# Patient Record
Sex: FEMALE | Race: WHITE | Employment: UNEMPLOYED | URBAN - METROPOLITAN AREA
[De-identification: names, ages, dates, MRNs, and addresses within clinical notes are randomized per-mention and may not be internally consistent; named-entity substitution may affect disease eponyms.]

---

## 2017-05-16 LAB
CHOLESTEROL, TOTAL: 268 MG/DL
CHOLESTEROL/HDL RATIO: ABNORMAL
HDLC SERPL-MCNC: 60 MG/DL (ref 35–70)
LDL CHOLESTEROL CALCULATED: 167 MG/DL (ref 0–160)
NONHDLC SERPL-MCNC: ABNORMAL MG/DL
TRIGL SERPL-MCNC: 206 MG/DL
VLDLC SERPL CALC-MCNC: 41 MG/DL

## 2019-05-15 ENCOUNTER — HOSPITAL ENCOUNTER (OUTPATIENT)
Dept: MRI IMAGING | Age: 54
Discharge: HOME | End: 2019-05-17
Payer: MEDICARE

## 2019-05-15 DIAGNOSIS — M96.1 POSTLAMINECTOMY SYNDROME OF LUMBAR REGION: ICD-10-CM

## 2019-05-15 PROCEDURE — 72158 MRI LUMBAR SPINE W/O & W/DYE: CPT

## 2019-05-15 PROCEDURE — 6360000004 HC RX CONTRAST MEDICATION: Performed by: CLINIC/CENTER

## 2019-05-15 PROCEDURE — A9579 GAD-BASE MR CONTRAST NOS,1ML: HCPCS | Performed by: CLINIC/CENTER

## 2019-05-15 RX ADMIN — GADOTERIDOL 13 ML: 279.3 INJECTION, SOLUTION INTRAVENOUS at 09:05

## 2019-07-18 ENCOUNTER — HOSPITAL ENCOUNTER (OUTPATIENT)
Dept: GENERAL RADIOLOGY | Age: 54
Discharge: HOME | End: 2019-07-20
Payer: MEDICARE

## 2019-07-18 DIAGNOSIS — M54.5 LOW BACK PAIN, UNSPECIFIED BACK PAIN LATERALITY, UNSPECIFIED CHRONICITY, WITH SCIATICA PRESENCE UNSPECIFIED: ICD-10-CM

## 2019-07-18 PROCEDURE — 72110 X-RAY EXAM L-2 SPINE 4/>VWS: CPT

## 2019-10-17 ENCOUNTER — HOSPITAL ENCOUNTER (OUTPATIENT)
Dept: GENERAL RADIOLOGY | Age: 54
Discharge: HOME | End: 2019-10-19
Payer: MEDICARE

## 2019-10-17 DIAGNOSIS — M54.50 LOW BACK PAIN, UNSPECIFIED BACK PAIN LATERALITY, UNSPECIFIED CHRONICITY, UNSPECIFIED WHETHER SCIATICA PRESENT: ICD-10-CM

## 2019-10-17 PROCEDURE — 72100 X-RAY EXAM L-S SPINE 2/3 VWS: CPT

## 2020-06-10 LAB
HIV AG/AB: NORMAL
TSH SERPL DL<=0.05 MIU/L-ACNC: 1.73 UIU/ML

## 2020-12-04 ENCOUNTER — VIRTUAL VISIT (OUTPATIENT)
Dept: INTERNAL MEDICINE | Age: 55
End: 2020-12-04
Payer: MEDICARE

## 2020-12-04 PROBLEM — N95.0 PMB (POSTMENOPAUSAL BLEEDING): Status: ACTIVE | Noted: 2020-12-04

## 2020-12-04 PROBLEM — M47.817 LUMBOSACRAL SPONDYLOSIS WITHOUT MYELOPATHY: Status: ACTIVE | Noted: 2020-12-04

## 2020-12-04 PROBLEM — I25.10 CORONARY ARTERY DISEASE INVOLVING NATIVE HEART WITHOUT ANGINA PECTORIS: Status: ACTIVE | Noted: 2018-11-25

## 2020-12-04 PROBLEM — J44.9 ASTHMA-CHRONIC OBSTRUCTIVE PULMONARY DISEASE OVERLAP SYNDROME (HCC): Status: ACTIVE | Noted: 2020-12-04

## 2020-12-04 PROBLEM — M47.812 CERVICAL SPONDYLOSIS WITHOUT MYELOPATHY: Status: ACTIVE | Noted: 2020-12-04

## 2020-12-04 PROBLEM — M51.36 DEGENERATION OF LUMBAR INTERVERTEBRAL DISC: Status: ACTIVE | Noted: 2020-12-04

## 2020-12-04 PROBLEM — M46.1 INFLAMMATION OF SACROILIAC JOINT (HCC): Status: ACTIVE | Noted: 2018-04-02

## 2020-12-04 PROBLEM — M54.50 CHRONIC LOW BACK PAIN: Status: ACTIVE | Noted: 2020-12-04

## 2020-12-04 PROBLEM — M96.1 LUMBAR POST-LAMINECTOMY SYNDROME: Status: ACTIVE | Noted: 2020-12-04

## 2020-12-04 PROBLEM — J44.89 ASTHMA-CHRONIC OBSTRUCTIVE PULMONARY DISEASE OVERLAP SYNDROME: Status: ACTIVE | Noted: 2020-12-04

## 2020-12-04 PROBLEM — E78.2 MIXED HYPERLIPIDEMIA: Status: ACTIVE | Noted: 2020-12-04

## 2020-12-04 PROBLEM — E03.9 HYPOTHYROIDISM: Status: ACTIVE | Noted: 2020-12-04

## 2020-12-04 PROBLEM — M46.1 SACROILIITIS (HCC): Status: ACTIVE | Noted: 2020-12-04

## 2020-12-04 PROBLEM — M51.369 DEGENERATION OF LUMBAR INTERVERTEBRAL DISC: Status: ACTIVE | Noted: 2020-12-04

## 2020-12-04 PROBLEM — G89.29 CHRONIC LOW BACK PAIN: Status: ACTIVE | Noted: 2020-12-04

## 2020-12-04 PROCEDURE — G8421 BMI NOT CALCULATED: HCPCS | Performed by: FAMILY MEDICINE

## 2020-12-04 PROCEDURE — G8484 FLU IMMUNIZE NO ADMIN: HCPCS | Performed by: FAMILY MEDICINE

## 2020-12-04 PROCEDURE — G8427 DOCREV CUR MEDS BY ELIG CLIN: HCPCS | Performed by: FAMILY MEDICINE

## 2020-12-04 PROCEDURE — 99203 OFFICE O/P NEW LOW 30 MIN: CPT | Performed by: FAMILY MEDICINE

## 2020-12-04 PROCEDURE — 3017F COLORECTAL CA SCREEN DOC REV: CPT | Performed by: FAMILY MEDICINE

## 2020-12-04 PROCEDURE — 4004F PT TOBACCO SCREEN RCVD TLK: CPT | Performed by: FAMILY MEDICINE

## 2020-12-04 RX ORDER — CLOPIDOGREL BISULFATE 75 MG/1
75 TABLET ORAL DAILY
COMMUNITY
Start: 2018-10-10 | End: 2021-06-18 | Stop reason: SDUPTHER

## 2020-12-04 RX ORDER — ALBUTEROL SULFATE 90 UG/1
AEROSOL, METERED RESPIRATORY (INHALATION)
COMMUNITY
Start: 2019-01-08 | End: 2021-06-18 | Stop reason: SDUPTHER

## 2020-12-04 RX ORDER — NITROGLYCERIN 0.4 MG/1
TABLET SUBLINGUAL
COMMUNITY
Start: 2018-10-10

## 2020-12-04 RX ORDER — ALBUTEROL SULFATE 2.5 MG/3ML
2.5 SOLUTION RESPIRATORY (INHALATION)
COMMUNITY
Start: 2019-01-08

## 2020-12-04 RX ORDER — DULOXETIN HYDROCHLORIDE 20 MG/1
20 CAPSULE, DELAYED RELEASE ORAL DAILY
Qty: 30 CAPSULE | Refills: 0 | Status: SHIPPED | OUTPATIENT
Start: 2020-12-04 | End: 2021-06-18 | Stop reason: SDUPTHER

## 2020-12-04 RX ORDER — CARVEDILOL 6.25 MG/1
6.25 TABLET ORAL 2 TIMES DAILY WITH MEALS
COMMUNITY
Start: 2018-10-25 | End: 2021-06-18 | Stop reason: SDUPTHER

## 2020-12-04 RX ORDER — ATORVASTATIN CALCIUM 80 MG/1
TABLET, FILM COATED ORAL
COMMUNITY
Start: 2018-10-10 | End: 2021-06-18 | Stop reason: SDUPTHER

## 2020-12-04 RX ORDER — LEVOTHYROXINE SODIUM 0.03 MG/1
25 TABLET ORAL DAILY
COMMUNITY
Start: 2019-05-14 | End: 2021-02-22 | Stop reason: SDUPTHER

## 2020-12-04 ASSESSMENT — ENCOUNTER SYMPTOMS: BACK PAIN: 1

## 2020-12-04 NOTE — PROGRESS NOTES
Patient: Sid Knox    YOB: 1965    Date: 12/4/20       Patient Active Problem List    Diagnosis Date Noted    Sacroiliitis ([facility] [address] 12/04/2020    Asthma-chronic obstructive pulmonary disease overlap syndrome ([facility] [address] 12/04/2020    Cervical spondylosis without myelopathy 12/04/2020    Chronic low back pain 12/04/2020    Degeneration of lumbar intervertebral disc 12/04/2020    Hypothyroidism 12/04/2020    Lumbar post-laminectomy syndrome 12/04/2020    Lumbosacral spondylosis without myelopathy 12/04/2020    Mixed hyperlipidemia 12/04/2020    PMB (postmenopausal bleeding) 12/04/2020    Coronary artery disease involving native heart without angina pectoris 11/25/2018    Lower urinary tract infectious disease 11/25/2013     No past medical history on file. No past surgical history on file. No family history on file.   Social History     Socioeconomic History    Marital status: Single     Spouse name: Not on file    Number of children: Not on file    Years of education: Not on file    Highest education level: Not on file   Occupational History    Not on file   Social Needs    Financial resource strain: Not on file    Food insecurity     Worry: Not on file     Inability: Not on file    Transportation needs     Medical: Not on file     Non-medical: Not on file   Tobacco Use    Smoking status: Not on file   Substance and Sexual Activity    Alcohol use: Not on file    Drug use: Not on file    Sexual activity: Not on file   Lifestyle    Physical activity     Days per week: Not on file     Minutes per session: Not on file    Stress: Not on file   Relationships    Social connections     Talks on phone: Not on file     Gets together: Not on file     Attends Alevism service: Not on file     Active member of club or organization: Not on file     Attends meetings of clubs or organizations: Not on file     Relationship status: Not on file    Intimate partner violence     Fear of current or ex partner: Not on file     Emotionally abused: Not on file     Physically abused: Not on file     Forced sexual activity: Not on file   Other Topics Concern    Not on file   Social History Narrative    Not on file     Current Outpatient Medications on File Prior to Visit   Medication Sig Dispense Refill    Aspirin (ECOTRIN LOW STRENGTH PO) daily       albuterol (PROVENTIL) (2.5 MG/3ML) 0.083% nebulizer solution Inhale 2.5 mg into the lungs      albuterol sulfate HFA (VENTOLIN HFA) 108 (90 Base) MCG/ACT inhaler Ventolin HFA 90 mcg/actuation aerosol inhaler   2 puffs prn      atorvastatin (LIPITOR) 80 MG tablet atorvastatin 80 mg tablet      carvedilol (COREG) 6.25 MG tablet Take 6.25 mg by mouth 2 times daily (with meals)       levothyroxine (SYNTHROID) 25 MCG tablet Take 25 mcg by mouth Daily       clopidogrel (PLAVIX) 75 MG tablet Take 75 mg by mouth daily       nitroGLYCERIN (NITROSTAT) 0.4 MG SL tablet nitroglycerin 0.4 mg sublingual tablet       No current facility-administered medications on file prior to visit. Allergies   Allergen Reactions    Gentamicin Anaphylaxis    Codeine Itching       Chief Complaint   Patient presents with   [address]     previous pcp Dr. Alix Echevarria -- Audio/Visual (During MYXZR-40 public health emergency)    Due to COVID 19 outbreak, patient's office visit was converted to a virtual visit. Patient was contacted and agreed to proceed with a virtual visit via [email or web address] me  The risks and benefits of converting to a virtual visit were discussed in light of the current infectious disease epidemic. Patient also understood that insurance coverage and co-pays are up to their individual insurance plans.       Pursuant to the emergency declaration under the [facility] waiver authority and the [employer] and [employer] Act, this Virtual  Visit was conducted, with patient's consent, to reduce the patient's risk of exposure to COVID-19 and provide continuity of care for an established patient. Services were provided through a video discussion virtually to substitute for in-person clinic visit. 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  She is very depressed and down because of her chronic pain  She has tried back injections, was on percocet's - nothing much helped    She lost her BF, dog, her mother, aunt, broke up with her BF, has had a lot of stressors  She has a lot on anxiety going on   She has poor sleep  She is forgetful     She has been to the hospital a few times for her pain  She has had some NSAID cocktails from the ER which she says did help      COPD: only on albuterol  She stopped a lot of the other drugs she was put on by her PCP   Social History     Tobacco Use   Smoking Status Current Every Day Smoker    Packs/day: 1.00    Start date: 12/4/1976   Smokeless Tobacco Former User         Review of Systems   Musculoskeletal: Positive for back pain. Constitutional: Negative for fatigue, fever and sweats. HEENT: Negative for eye discharge and vision loss. Negative for ear drainage, hearing loss and nasal drainage. Respiratory: Negative for cough, dyspnea and wheezing. Physical Exam    Nursing note reviewed. Constitutional:       General: no acute distress. Appearance: Normal appearance. normal weight. not ill-appearing, toxic-appearing or diaphoretic. HENT:      Head: Normocephalic and atraumatic.       Right Ear: External ear normal.      Left Ear: External ear normal.      Nose: Nose normal.   Eyes:      General: No scleral icterus. Right eye: No discharge. Left eye: No discharge. Extraocular Movements: Extraocular movements intact. Conjunctiva/sclera: Conjunctivae normal.   Neck:      Musculoskeletal: Normal range of motion. Pulmonary:      Effort: Pulmonary effort is normal.   Musculoskeletal: Normal range of motion. Neurological:      General: No focal deficit present. Mental Status: alert. Mental status is at baseline. Psychiatric:         Attention and Perception: Attention and perception normal.         Mood and Affect: Mood and affect normal.         Speech: Speech normal.         Behavior: Behavior normal. Behavior is cooperative. Thought Content: Thought content normal.         Cognition and Memory: Memory normal.     Due to this being a TeleHealth encounter, evaluation of the following organ systems is limited: Vitals/Constitutional/EENT/Resp/CV/GI//MS/Neuro/Skin/Heme-Lymph-Imm. Assessment/Plan:  Owen WHALEY was seen today for establish care, back pain and anxiety. Diagnoses and all orders for this visit:    Lumbosacral spondylosis without myelopathy  -     JACINTA Magallon MD, Pain Management, [place]  -     DULoxetine (CYMBALTA) 20 MG extended release capsule; Take 1 capsule by mouth daily  Lumbar post-laminectomy syndrome  -     JACINTA Magallon MD, Pain Management, [place]  -     DULoxetine (CYMBALTA) 20 MG extended release capsule; Take 1 capsule by mouth daily  Degeneration of lumbar intervertebral disc  -     JACINTA Magallon MD, Pain Management, [place]  -     DULoxetine (CYMBALTA) 20 MG extended release capsule; Take 1 capsule by mouth daily  Chronic low back pain, unspecified back pain laterality, unspecified whether sciatica present  -     JACINTA Magallon MD, Pain Management, [place]  -     DULoxetine (CYMBALTA) 20 MG extended release capsule;  Take 1 capsule by mouth daily  Cervical spondylosis without myelopathy  -     JACINTA Magallon MD, Pain Management, [place]  -     DULoxetine (CYMBALTA) 20 MG extended release capsule; Take 1 capsule by mouth daily  -     diclofenac sodium (VOLTAREN) 1 % GEL; Apply 4 g topically 2 times daily  Advised best for her will be to see pain management - they will be able to address her pain the best  Trial of cymbalta for pain and anxiety  May need to add on sleep aid later on  Referral to   Discussed smoking cessation  Cont f/u cards        Orders Placed This Encounter   Procedures    AFL - Yoana Mtz MD, Pain Management, [place]     Referral Priority:   Routine     Referral Type:   Eval and Treat     Referral Reason:   Specialty Services Required     Referred to Provider:   Nomi Quiñones MD     Requested Specialty:   Pain Medicine     Number of Visits Requested:   1         Return in about 4 weeks (around 1/1/2021) for chronic pain . Pt is aware that the insurance will be charged for this electric/telephone/virtual visit.

## 2021-02-22 RX ORDER — LEVOTHYROXINE SODIUM 0.03 MG/1
25 TABLET ORAL DAILY
Qty: 90 TABLET | Refills: 0 | Status: SHIPPED | OUTPATIENT
Start: 2021-02-22 | End: 2021-06-18 | Stop reason: SDUPTHER

## 2021-02-22 NOTE — TELEPHONE ENCOUNTER
Requesting medication refill. Please approve or deny this request.    Rx requested:  Requested Prescriptions     Pending Prescriptions Disp Refills    levothyroxine (SYNTHROID) 25 MCG tablet 90 tablet 1     Sig: Take 1 tablet by mouth Daily       Last Office Visit, reason seen and by who:   1/4/2021 New  Dr. Morteza Campuzano    Pt states she has been working a lot. FOLLOW UP PLAN FROM LAST VISIT: COPY AND PASTE FROM LAST NOTE       Return in about 4 weeks (around 1/1/2021) for chronic pain . PATIENT CONTACTED FOR A FOLLOW UP APPT: YES OR NO    no    Next Visit Date:  No future appointments.

## 2021-06-18 ENCOUNTER — VIRTUAL VISIT (OUTPATIENT)
Dept: INTERNAL MEDICINE | Age: 56
End: 2021-06-18
Payer: MEDICARE

## 2021-06-18 DIAGNOSIS — G89.29 CHRONIC LOW BACK PAIN, UNSPECIFIED BACK PAIN LATERALITY, UNSPECIFIED WHETHER SCIATICA PRESENT: ICD-10-CM

## 2021-06-18 DIAGNOSIS — I25.10 CORONARY ARTERY DISEASE INVOLVING NATIVE CORONARY ARTERY OF NATIVE HEART WITHOUT ANGINA PECTORIS: ICD-10-CM

## 2021-06-18 DIAGNOSIS — F32.A DEPRESSION, UNSPECIFIED DEPRESSION TYPE: ICD-10-CM

## 2021-06-18 DIAGNOSIS — M96.1 LUMBAR POST-LAMINECTOMY SYNDROME: ICD-10-CM

## 2021-06-18 DIAGNOSIS — M47.817 LUMBOSACRAL SPONDYLOSIS WITHOUT MYELOPATHY: ICD-10-CM

## 2021-06-18 DIAGNOSIS — M46.1 INFLAMMATION OF SACROILIAC JOINT (HCC): ICD-10-CM

## 2021-06-18 DIAGNOSIS — M51.36 DEGENERATION OF LUMBAR INTERVERTEBRAL DISC: ICD-10-CM

## 2021-06-18 DIAGNOSIS — M47.812 CERVICAL SPONDYLOSIS WITHOUT MYELOPATHY: ICD-10-CM

## 2021-06-18 DIAGNOSIS — E03.9 HYPOTHYROIDISM, UNSPECIFIED TYPE: ICD-10-CM

## 2021-06-18 DIAGNOSIS — Z00.00 ROUTINE GENERAL MEDICAL EXAMINATION AT A HEALTH CARE FACILITY: Primary | ICD-10-CM

## 2021-06-18 DIAGNOSIS — J44.9 ASTHMA-CHRONIC OBSTRUCTIVE PULMONARY DISEASE OVERLAP SYNDROME (HCC): ICD-10-CM

## 2021-06-18 DIAGNOSIS — E78.2 MIXED HYPERLIPIDEMIA: ICD-10-CM

## 2021-06-18 DIAGNOSIS — M54.50 CHRONIC LOW BACK PAIN, UNSPECIFIED BACK PAIN LATERALITY, UNSPECIFIED WHETHER SCIATICA PRESENT: ICD-10-CM

## 2021-06-18 PROCEDURE — 3017F COLORECTAL CA SCREEN DOC REV: CPT | Performed by: FAMILY MEDICINE

## 2021-06-18 PROCEDURE — G0438 PPPS, INITIAL VISIT: HCPCS | Performed by: FAMILY MEDICINE

## 2021-06-18 RX ORDER — CLOPIDOGREL BISULFATE 75 MG/1
75 TABLET ORAL DAILY
Qty: 90 TABLET | Refills: 0 | Status: SHIPPED | OUTPATIENT
Start: 2021-06-18 | End: 2021-09-28 | Stop reason: SDUPTHER

## 2021-06-18 RX ORDER — LEVOTHYROXINE SODIUM 0.03 MG/1
25 TABLET ORAL DAILY
Qty: 90 TABLET | Refills: 0 | Status: SHIPPED | OUTPATIENT
Start: 2021-06-18 | End: 2021-09-28 | Stop reason: SDUPTHER

## 2021-06-18 RX ORDER — METHYLPREDNISOLONE 4 MG/1
TABLET ORAL
Qty: 1 KIT | Refills: 0 | Status: SHIPPED | OUTPATIENT
Start: 2021-06-18 | End: 2021-06-24

## 2021-06-18 RX ORDER — CARVEDILOL 6.25 MG/1
6.25 TABLET ORAL 2 TIMES DAILY WITH MEALS
Qty: 180 TABLET | Refills: 0 | Status: SHIPPED | OUTPATIENT
Start: 2021-06-18 | End: 2021-09-28 | Stop reason: SDUPTHER

## 2021-06-18 RX ORDER — ALBUTEROL SULFATE 90 UG/1
AEROSOL, METERED RESPIRATORY (INHALATION)
Qty: 3 INHALER | Refills: 1 | Status: SHIPPED | OUTPATIENT
Start: 2021-06-18 | End: 2021-09-24 | Stop reason: SDUPTHER

## 2021-06-18 RX ORDER — ATORVASTATIN CALCIUM 80 MG/1
TABLET, FILM COATED ORAL
Qty: 90 TABLET | Refills: 0 | Status: SHIPPED | OUTPATIENT
Start: 2021-06-18 | End: 2021-09-28 | Stop reason: SDUPTHER

## 2021-06-18 RX ORDER — DULOXETIN HYDROCHLORIDE 20 MG/1
20 CAPSULE, DELAYED RELEASE ORAL DAILY
Qty: 90 CAPSULE | Refills: 1 | Status: SHIPPED | OUTPATIENT
Start: 2021-06-18 | End: 2021-09-28 | Stop reason: SDUPTHER

## 2021-06-18 SDOH — ECONOMIC STABILITY: FOOD INSECURITY: WITHIN THE PAST 12 MONTHS, YOU WORRIED THAT YOUR FOOD WOULD RUN OUT BEFORE YOU GOT MONEY TO BUY MORE.: NEVER TRUE

## 2021-06-18 SDOH — ECONOMIC STABILITY: FOOD INSECURITY: WITHIN THE PAST 12 MONTHS, THE FOOD YOU BOUGHT JUST DIDN'T LAST AND YOU DIDN'T HAVE MONEY TO GET MORE.: NEVER TRUE

## 2021-06-18 ASSESSMENT — PATIENT HEALTH QUESTIONNAIRE - PHQ9
SUM OF ALL RESPONSES TO PHQ9 QUESTIONS 1 & 2: 1
1. LITTLE INTEREST OR PLEASURE IN DOING THINGS: 0
2. FEELING DOWN, DEPRESSED OR HOPELESS: 1
SUM OF ALL RESPONSES TO PHQ QUESTIONS 1-9: 1

## 2021-06-18 ASSESSMENT — LIFESTYLE VARIABLES: HOW OFTEN DO YOU HAVE A DRINK CONTAINING ALCOHOL: 0

## 2021-06-18 ASSESSMENT — SOCIAL DETERMINANTS OF HEALTH (SDOH): HOW HARD IS IT FOR YOU TO PAY FOR THE VERY BASICS LIKE FOOD, HOUSING, MEDICAL CARE, AND HEATING?: NOT HARD AT ALL

## 2021-06-18 NOTE — PROGRESS NOTES
Medicare Annual Wellness Visit  Name: Warden Lainez Date: 2021   MRN: [id number] Sex: Female   Age: 54 y.o. Ethnicity: Non-/Non    : 1965 Race: Ava Bai is here for Medicare AWV and Back Pain (from yard work x 2 weeks. would like to erich ultram rx)    Screenings for behavioral, psychosocial and functional/safety risks, and cognitive dysfunction are all negative except as indicated below. These results, as well as other patient data from the [address] form, are documented in Flowsheets linked to this Encounter. Allergies   Allergen Reactions    Gentamicin Anaphylaxis    Codeine Itching       Prior to Visit Medications    Medication Sig Taking?  Authorizing Provider   albuterol sulfate HFA (VENTOLIN HFA) 108 (90 Base) MCG/ACT inhaler Ventolin HFA 90 mcg/actuation aerosol inhaler   2 puffs prn Yes Historical Provider, MD   atorvastatin (LIPITOR) 80 MG tablet atorvastatin 80 mg tablet Yes Historical Provider, MD   diclofenac sodium (VOLTAREN) 1 % GEL Apply 4 g topically 2 times daily Yes Cisco Andrade MD   levothyroxine (SYNTHROID) 25 MCG tablet Take 1 tablet by mouth Daily  Patient not taking: Reported on 2021  Cisco Andrade MD   Aspirin (ECOTRIN LOW STRENGTH PO) daily   Patient not taking: Reported on 2021  Historical Provider, MD   albuterol (PROVENTIL) (2.5 MG/3ML) 0.083% nebulizer solution Inhale 2.5 mg into the lungs  Patient not taking: Reported on 2021  Historical Provider, MD   carvedilol (COREG) 6.25 MG tablet Take 6.25 mg by mouth 2 times daily (with meals)   Patient not taking: Reported on 2021  Historical Provider, MD   clopidogrel (PLAVIX) 75 MG tablet Take 75 mg by mouth daily   Patient not taking: Reported on 2021  Historical Provider, MD   nitroGLYCERIN (NITROSTAT) 0.4 MG SL tablet nitroglycerin 0.4 mg sublingual tablet  Historical Provider, MD   DULoxetine (CYMBALTA) 20 MG extended release capsule Take 1 capsule by mouth daily  Patient not taking: Reported on 6/18/2021  Jaylan Chappell MD       Past Medical History:   Diagnosis Date    Chronic back pain     H/O foot surgery     bunion removal    Heart attack ([facility] [address]     Hyperlipidemia     Hypertension     Hypothyroidism     Kidney disease        Past Surgical History:   Procedure Laterality Date    BACK SURGERY  [id number]    BUNIONECTOMY      LUNG SURGERY      tube placed due to collapsed lung       Family History   Problem Relation Age of Onset    Kidney Disease Father     Heart Disease Father        CareTeam (Including outside providers/suppliers regularly involved in providing care):   Patient Care Team:  Jaylan Chappell MD as PCP - General (Family Medicine)  Jaylan Chappell MD as PCP - [facility] Empaneled Provider    Wt Readings from Last 3 Encounters:   No data found for Wt     Patient-Reported Vitals 12/4/2020   Patient-Reported Weight 155lb   Patient-Reported Height 5' 0\"      There is no height or weight on file to calculate BMI. Based upon direct observation of the patient, evaluation of cognition reveals recent and remote memory intact. Patient's complete Health Risk Assessment and screening values have been reviewed and are found in Flowsheets. The following problems were reviewed today and where indicated follow up appointments were made and/or referrals ordered. Positive Risk Factor Screenings with Interventions:      Cognitive:   Words recalled: 2 Words Recalled  Total Score Interpretation: Positive Mini-Cog  Cognitive Impairment Interventions:  · Patient declines any further evaluation/treatment for cognitive impairment      Substance History:  Social History     Tobacco History     Smoking Status  Current Every Day Smoker Smoking Start Date  12/4/1976 Smoking Frequency  1 pack/day for 40 years (36 pk yrs) Smoking Tobacco Type  Cigarettes    Smokeless Tobacco Use  Never Used          Alcohol History     Alcohol Use Status Yes Drinks/Week  10 Cans of beer per week Amount  10.0 standard drinks of alcohol/wk          Drug Use     Drug Use Status  Not Currently Types  Marijuana          Sexual Activity     Sexually Active  Not Currently               Alcohol Screening:       A score of 8 or more is associated with harmful or hazardous drinking. A score of 13 or more in women, and 15 or more in men, is likely to indicate alcohol dependence. Substance Abuse Interventions:  · Tobacco abuse:  patient is not ready to work toward tobacco cessation at this time    [address] and ACP:  General  In general, how would you say your health is?: Fair  In the past 7 days, have you experienced any of the following?  New or Increased Pain, New or Increased Fatigue, Loneliness, Social Isolation, Stress or Anger?: (!) New or Increased Pain, Stress  Do you get the social and emotional support that you need?: Yes  Do you have a Living Will?: (!) No  Advance Directives     Power of  Living Will ACP-Advance Directive ACP-Power of     Not on File Not on File Not on File Not on File      General Health Risk Interventions:  · Pain issues: pain management referral ordered, physical therapy referral ordered - she refused  · Stress: counseling/psychotherapy referral ordered for further evaluation/treatment  · No Living Will: Patient declines ACP discussion/assistance    Health Habits/Nutrition:  Health Habits/Nutrition  Do you exercise for at least 20 minutes 2-3 times per week?: (!) No  Have you lost any weight without trying in the past 3 months?: No  Do you eat only one meal per day?: No  Have you seen the dentist within the past year?: N/A - wear dentures     Health Habits/Nutrition Interventions:  · Inadequate physical activity:  patient is not ready to increase his/her physical activity level at this time    Hearing/Vision:  No exam data present  Hearing/Vision  Do you or your family notice any trouble with your hearing that hasn't been managed with hearing aids?: No  Do you have difficulty driving, watching TV, or doing any of your daily activities because of your eyesight?: No  Have you had an eye exam within the past year?: (!) No  Hearing/Vision Interventions:  · Vision concerns:  patient encouraged to make appointment with his/her eye specialist    Safety:  Safety  Do you have working smoke detectors?: Yes  Have all throw rugs been removed or fastened?: (!) No  Do you have non-slip mats or surfaces in all bathtubs/showers?: Yes  Do all of your stairways have a railing or banister?:  (n/a)  Are your doorways, halls and stairs free of clutter?: Yes  Do you always fasten your seatbelt when you are in a car?: Yes  Safety Interventions:  · Home safety tips provided     Personalized Preventive Plan   Current Health Maintenance Status  Immunization History   Administered Date(s) Administered    Influenza Virus Vaccine 11/25/2013, 01/07/2016, 01/07/2016, 10/16/2017, 10/16/2017    Influenza, Quadv, IM, PF (6 mo and older Fluzone, Flulaval, Fluarix, and 3 yrs and older Afluria) 10/09/2018    Pneumococcal Polysaccharide ([id number]) 11/25/2013    Tdap (Boostrix, Adacel) 07/11/2016        Health Maintenance   Topic Date Due    COVID-19 Vaccine (1) Never done    Cervical cancer screen  Never done    Shingles Vaccine (1 of 2) Never done    Lipid screen  05/16/2018    Breast cancer screen  03/09/2020    Low dose CT lung screening  Never done   [employer] Visit (AWV)  Never done    TSH testing  06/10/2021    Flu vaccine (Season Ended) 09/01/2021    Colon cancer screen colonoscopy  02/11/2025    DTaP/Tdap/Td vaccine (2 - Td or Tdap) 07/11/2026    Pneumococcal 0-64 years Vaccine (2 of 2) 10/15/2030    Hepatitis C screen  Completed    HIV screen  Completed    Hepatitis A vaccine  Aged Out    Hepatitis B vaccine  Aged Out    Hib vaccine  Aged Out    Meningococcal (ACWY) vaccine  Aged Out     Recommendations for [employer] Due: see orders and patient instructions/AVS.  . Recommended screening schedule for the next 5-10 years is provided to the patient in written form: see Patient Instructions/AVS.    Wei WHALEY was seen today for medicare awv and back pain. Diagnoses and all orders for this visit:    Routine general medical examination at a health care facility    Lumbosacral spondylosis without myelopathy  -     DULoxetine (CYMBALTA) 20 MG extended release capsule; Take 1 capsule by mouth daily    Lumbar post-laminectomy syndrome  -     DULoxetine (CYMBALTA) 20 MG extended release capsule; Take 1 capsule by mouth daily    Degeneration of lumbar intervertebral disc  -     DULoxetine (CYMBALTA) 20 MG extended release capsule; Take 1 capsule by mouth daily    Chronic low back pain, unspecified back pain laterality, unspecified whether sciatica present  -     DULoxetine (CYMBALTA) 20 MG extended release capsule; Take 1 capsule by mouth daily  -     methylPREDNISolone (MEDROL DOSEPACK) 4 MG tablet; Take by mouth. Cervical spondylosis without myelopathy  -     DULoxetine (CYMBALTA) 20 MG extended release capsule; Take 1 capsule by mouth daily    Hypothyroidism, unspecified type  -     levothyroxine (SYNTHROID) 25 MCG tablet; Take 1 tablet by mouth Daily  -     TSH with Reflex; Future  -     T4, Free; Future    Inflammation of sacroiliac joint (HCC)    Asthma-chronic obstructive pulmonary disease overlap syndrome (HCC)  -     albuterol sulfate HFA (VENTOLIN HFA) 108 (90 Base) MCG/ACT inhaler; Ventolin HFA 90 mcg/actuation aerosol inhaler   2 puffs prn    Mixed hyperlipidemia  -     Comprehensive Metabolic Panel; Future  -     Lipid Panel; Future  -     atorvastatin (LIPITOR) 80 MG tablet; atorvastatin 80 mg tablet    Coronary artery disease involving native coronary artery of native heart without angina pectoris  -     clopidogrel (PLAVIX) 75 MG tablet; Take 1 tablet by mouth daily  -     carvedilol (COREG) 6.25 MG tablet;  Take 1 tablet by mouth 2 times daily (with meals)  -     CBC Auto Differential; Future  -     Lipid Panel; Future    Depression, unspecified depression type  -     Ambulatory referral to Psychology               Shannon Swain is a 54 y.o. female being evaluated by a Virtual Visit (video and audio) encounter to address concerns as mentioned above. A caregiver was present when appropriate. Due to this being a TeleHealth encounter (During XHANK-93 public health emergency), evaluation of the following organ systems was limited: Vitals/Constitutional/EENT/Resp/CV/GI//MS/Neuro/Skin/Heme-Lymph-Imm. Pursuant to the emergency declaration under the [address] [place] [address], [address] [place] [address] authority and the [employer] Act, this Virtual Visit was conducted with patient's (and/or legal guardian's) consent, to reduce the patient's risk of exposure to COVID-19 and provide necessary medical care. The patient (and/or legal guardian) has also been advised to contact this office for worsening conditions or problems, and seek emergency medical treatment and/or call 911 if deemed necessary. Patient identification was verified at the start of the visit: Yes    Services were provided through a video synchronous discussion virtually to substitute for in-person clinic visit. Patient and provider were located at their individual homes. --Fidelia Solis MD on 6/18/2021 at 9:26 AM    An electronic signature was used to authenticate this note.

## 2021-06-18 NOTE — PATIENT INSTRUCTIONS
Personalized Preventive Plan for Trey  - 6/18/2021  Medicare offers a range of preventive health benefits. Some of the tests and screenings are paid in full while other may be subject to a deductible, co-insurance, and/or copay. Some of these benefits include a comprehensive review of your medical history including lifestyle, illnesses that may run in your family, and various assessments and screenings as appropriate. After reviewing your medical record and screening and assessments performed today your provider may have ordered immunizations, labs, imaging, and/or referrals for you. A list of these orders (if applicable) as well as your Preventive Care list are included within your After Visit Summary for your review. Other Preventive Recommendations:    · A preventive eye exam performed by an eye specialist is recommended every 1-2 years to screen for glaucoma; cataracts, macular degeneration, and other eye disorders. · A preventive dental visit is recommended every 6 months. · Try to get at least 150 minutes of exercise per week or 10,000 steps per day on a pedometer . · Order or download the FREE \"Exercise & Physical Activity: Your Everyday Guide\" from [employer]. Call 8-825.282.1736 or search [employer] online. · You need 1389-0604 mg of calcium and 1744-6710 IU of vitamin D per day. It is possible to meet your calcium requirement with diet alone, but a vitamin D supplement is usually necessary to meet this goal.  · When exposed to the sun, use a sunscreen that protects against both UVA and UVB radiation with an SPF of 30 or greater. Reapply every 2 to 3 hours or after sweating, drying off with a towel, or swimming. · Always wear a seat belt when traveling in a car. Always wear a helmet when riding a bicycle or motorcycle.

## 2021-06-30 ENCOUNTER — VIRTUAL VISIT (OUTPATIENT)
Dept: BEHAVIORAL/MENTAL HEALTH CLINIC | Age: 56
End: 2021-06-30
Payer: MEDICARE

## 2021-06-30 DIAGNOSIS — F33.1 MAJOR DEPRESSIVE DISORDER, RECURRENT, MODERATE (HCC): Primary | ICD-10-CM

## 2021-06-30 DIAGNOSIS — F43.21 GRIEF REACTION: ICD-10-CM

## 2021-06-30 PROCEDURE — 90791 PSYCH DIAGNOSTIC EVALUATION: CPT | Performed by: PSYCHOLOGIST

## 2021-06-30 ASSESSMENT — PATIENT HEALTH QUESTIONNAIRE - PHQ9
9. THOUGHTS THAT YOU WOULD BE BETTER OFF DEAD, OR OF HURTING YOURSELF: 0
SUM OF ALL RESPONSES TO PHQ9 QUESTIONS 1 & 2: 5
3. TROUBLE FALLING OR STAYING ASLEEP: 3
SUM OF ALL RESPONSES TO PHQ QUESTIONS 1-9: 19
5. POOR APPETITE OR OVEREATING: 1
SUM OF ALL RESPONSES TO PHQ QUESTIONS 1-9: 19
6. FEELING BAD ABOUT YOURSELF - OR THAT YOU ARE A FAILURE OR HAVE LET YOURSELF OR YOUR FAMILY DOWN: 3
SUM OF ALL RESPONSES TO PHQ QUESTIONS 1-9: 19
1. LITTLE INTEREST OR PLEASURE IN DOING THINGS: 2
2. FEELING DOWN, DEPRESSED OR HOPELESS: 3
7. TROUBLE CONCENTRATING ON THINGS, SUCH AS READING THE NEWSPAPER OR WATCHING TELEVISION: 2
8. MOVING OR SPEAKING SO SLOWLY THAT OTHER PEOPLE COULD HAVE NOTICED. OR THE OPPOSITE, BEING SO FIGETY OR RESTLESS THAT YOU HAVE BEEN MOVING AROUND A LOT MORE THAN USUAL: 2
10. IF YOU CHECKED OFF ANY PROBLEMS, HOW DIFFICULT HAVE THESE PROBLEMS MADE IT FOR YOU TO DO YOUR WORK, TAKE CARE OF THINGS AT HOME, OR GET ALONG WITH OTHER PEOPLE: 1
4. FEELING TIRED OR HAVING LITTLE ENERGY: 3

## 2021-06-30 NOTE — PATIENT INSTRUCTIONS
1. Follow up as scheduled to finish continuing care  2.  Continue with your current treatment providers/AA participation

## 2021-06-30 NOTE — PROGRESS NOTES
Behavioral Health Consultation  Makenzie Sawant, Ph.D., [facility]  Psychologist  6/30/21  3:10 PM EDT      Time spent with Patient: 30 minutes  This is patient's first  [facility] appointment. Reason for Consult:  depression and stress  Referring Provider: Yeni Laboy MD    Pt provided informed consent for the behavioral health program. Discussed with patient model of service to include the limits of confidentiality (i.e. abuse reporting, suicide intervention, etc.) and short-term intervention focused approach. Pt indicated understanding. Feedback given to PCP. TELEHEALTH EVALUATION -- Audio and/or Visual (During KIJTI-02 public health emergency)    Due to COVID 19 outbreak, patient's office visit was converted to a virtual visit. Patient was contacted and agreed to proceed with a virtual visit via Telephone Visit. Patient reports that they are located at home. Provider Location [place] at [place] in [place]. The risks and benefits of converting to a virtual visit were discussed in light of the current infectious disease epidemic. Patient (and/or legal guardian) also understood that insurance coverage and co-pays are up to their individual insurance plans. Patient (and/or legal guardian) provides verbal consent for this visit to be billed to insurance. Pursuant to the emergency declaration under the [facility], [facility] waiver authority and the [employer] General Act, this Virtual  Visit was conducted, with patient's consent, to reduce the patient's risk of exposure to COVID-19 and provide continuity of care for an established patient. Services were provided through an audio synchronous discussion virtually to substitute for in-person clinic visit. S:    Pt reports a history of MH treatment, last occurring about 2-3 yrs ago. Pt was referred by her PCP after pt noted an interest in counseling, grief counseling related.  In 2/2019 best friend , followed by death of her dog, 2020 mother , Children's father  shortly after, aunt  and notes impact of a break up with her longtime SO. Pt notes additional conflict in family after she had a suicide attempt. Pt notes her mother's passing began to trigger reflection on ACEs/childhood trauma. Pt began drinking, and notes increased emotional distress over the past 6-7 months. Pt is living at her cousins' home temporarily and notes impact of her cousin being a \"raging alcoholic\". Pt  Notes a close relationship with cousin. Pt is not currently in a relationship, but also potential reconciliation. Pt dated her SO since about  and notes a serious break up about 6-7 months ago. Pt states \"it's good for the most part but when it's bad it's really bad\" noting that he has relocated 3 times during their relationship together. Pt is , has 3 adult children, 5 grandchildren that she has limited to no contact with currently. 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 Pt enjoyed her work. Pt is from [facility] [place] [employer], has some contact with extended family. Pt reports no friendship in her life. Pt is on social media but not very active. Pt enjoys the outdoors- fishing, camping, hiking, swimming etc. Pt is active in Judaism, identifies as Episcopal. Pt notes a struggle with alcohol use- had been sober for 10yrs until she relapsed following her mother's death. Does have current legal involvement related to an [employer]. Pt last drank 21, just started AA, [facility], primarily online meetings, no sponsor/home group. Pt smokes cigarettes at about 1PPD, denies THC use or other substances. Pt takes Cymbalta for back pain and states it is not helpful, anticipates possibly a pain pump. Pt ntoes she is unsure of interest in [facility] medication.      Pt's best friend had health issues but her death was still unexpected, mother  after only about a week's illness \"went quickly\", unexpected passing of children's father \"it was all a sock\". Pt denies SI/HI. O:  MSE:    Appearance   Not assessed  Appetite normal  Sleep disturbance No  Fatigue Yes  Loss of pleasure Yes  Impulsive behavior Yes  Speech    spontaneous, normal rate and normal volume  Mood    Depressed  Affect    Not assessed  Thought Content    intact  Thought Process    coherent  Associations    logical connections  Insight    Fair  Judgment    Intact  Orientation    oriented to person, place, time, and general circumstances  Memory    recent and remote memory intact  Attention/Concentration    impaired  Morbid ideation No  Suicide Assessment    no suicidal ideation      History:    Medications:   Current Outpatient Medications   Medication Sig Dispense Refill    levothyroxine (SYNTHROID) 25 MCG tablet Take 1 tablet by mouth Daily 90 tablet 0    DULoxetine (CYMBALTA) 20 MG extended release capsule Take 1 capsule by mouth daily 90 capsule 1    clopidogrel (PLAVIX) 75 MG tablet Take 1 tablet by mouth daily 90 tablet 0    carvedilol (COREG) 6.25 MG tablet Take 1 tablet by mouth 2 times daily (with meals) 180 tablet 0    albuterol sulfate HFA (VENTOLIN HFA) 108 (90 Base) MCG/ACT inhaler Ventolin HFA 90 mcg/actuation aerosol inhaler   2 puffs prn 3 Inhaler 1    atorvastatin (LIPITOR) 80 MG tablet atorvastatin 80 mg tablet 90 tablet 0    Aspirin (ECOTRIN LOW STRENGTH PO) daily  (Patient not taking: Reported on 6/18/2021)      albuterol (PROVENTIL) (2.5 MG/3ML) 0.083% nebulizer solution Inhale 2.5 mg into the lungs (Patient not taking: Reported on 6/18/2021)      nitroGLYCERIN (NITROSTAT) 0.4 MG SL tablet nitroglycerin 0.4 mg sublingual tablet      diclofenac sodium (VOLTAREN) 1 % GEL Apply 4 g topically 2 times daily 150 g 2     No current facility-administered medications for this visit.        Social History:   Social History     Socioeconomic History    Marital status: Single Spouse name: Not on file    Number of children: Not on file    Years of education: Not on file    Highest education level: Not on file   Occupational History    Not on file   Tobacco Use    Smoking status: Current Every Day Smoker     Packs/day: 1.00     Years: 40.00     Pack years: 40.00     Types: Cigarettes     Start date: 12/4/1976    Smokeless tobacco: Never Used   Vaping Use    Vaping Use: Former   Substance and Sexual Activity    Alcohol use: Yes     Alcohol/week: 10.0 standard drinks     Types: 10 Cans of beer per week    Drug use: Not Currently     Types: Marijuana    Sexual activity: Not Currently   Other Topics Concern    Not on file   Social History Narrative    Not on file     Social Determinants of Health     Financial Resource Strain: Low Risk     Difficulty of Paying Living Expenses: Not hard at all   Food Insecurity: No Food Insecurity    Worried About Running Out of Food in the Last Year: Never true    Darlene of Food in the Last Year: Never true   Transportation Needs:     Lack of Transportation (Medical):  Lack of Transportation (Non-Medical):    Physical Activity:     Days of Exercise per Week:     Minutes of Exercise per Session:    Stress:     Feeling of Stress :    Social Connections:     Frequency of Communication with Friends and Family:     Frequency of Social Gatherings with Friends and Family:     Attends Quaker Services:     Active Member of Clubs or Organizations:     Attends Club or Organization Meetings:     Marital Status:    Intimate Partner Violence:     Fear of Current or Ex-Partner:     Emotionally Abused:     Physically Abused:     Sexually Abused:        TOBACCO:   reports that she has been smoking cigarettes. She started smoking about 44 years ago. She has a 40.00 pack-year smoking history. She has never used smokeless tobacco.  ETOH:   reports current alcohol use of about 10.0 standard drinks of alcohol per week.     Family History: Family History   Problem Relation Age of Onset    Kidney Disease Father     Heart Disease Father          A:  Administered the PHQ9 which indicates a self report of moderately severe symptom distress. Pt would benefit from [facility] services to increase coping skills to provide symptom management/control/relief. PHQ Scores 6/30/2021 6/18/2021   PHQ2 Score 5 1   PHQ9 Score 19 1     Interpretation of Total Score Depression Severity: 1-4 = Minimal depression, 5-9 = Mild depression, 10-14 = Moderate depression, 15-19 = Moderately severe depression, 20-27 = Severe depression      Diagnosis:    Major depressive disorder; recurrent and moderate  Grief reaction  Alcohol use disorder      Diagnosis Date    Chronic back pain     H/O foot surgery     bunion removal    Heart attack ([address] [address]     Hyperlipidemia     Hypertension     Hypothyroidism     Kidney disease            Plan:  Pt interventions:  Established rapport, Conducted functional assessment, [place] Tobacco-setting to identify pt's primary goals for [facility] visit / overall health, Supportive techniques and Provided Psychoeducation re: [facility] services      Pt Behavioral Change Plan:    1. Follow up as scheduled to finish continuing care  2. Continue with your current treatment providers/AA participation    Please note this report has been partially produced using speech recognition software  And may cause contain errors related to that system including grammar, punctuation and spelling as well as words and phrases that may seem inappropriate. If there are questions or concerns please feel free to contact me to clarify.

## 2021-07-15 ENCOUNTER — TELEPHONE (OUTPATIENT)
Dept: INTERNAL MEDICINE | Age: 56
End: 2021-07-15

## 2021-07-15 NOTE — TELEPHONE ENCOUNTER
Patient called. Noel Naranjo from [facility] got the call. She then called here with patient on the other line. Per Noel Naranjo,  Patient received a citation/DEE  for med VOLTAREN1% GEL. She used the medication on her back. It set off her ankle monitor. Patient is requesting the Dr write a letter stating the medication has alcohol in it.      Thank you

## 2021-07-15 NOTE — TELEPHONE ENCOUNTER
----- Message from Mesha Chua sent at 7/15/2021 11:18 AM EDT -----  Subject: Message to Provider    QUESTIONS  Information for Provider? Patient needs a letter or note on letterhead   stating that the Voltaren GEL that was prescribed for back pain contains   some alcohol in the gel. This information is being requested by her    & the court. to explain the presence of the alcohol in the   medication and that it was prescribed by her PCP ASAP. Please send the   letter by email if possible to [email or web address]. Please call   when sent.  ---------------------------------------------------------------------------  --------------  CALL BACK INFO  What is the best way for the office to contact you? OK to leave message on   voicemail  Preferred Call Back Phone Number? [phone]  ---------------------------------------------------------------------------  --------------  SCRIPT ANSWERS  Relationship to Patient?  Self

## 2021-07-16 NOTE — TELEPHONE ENCOUNTER
Spoke with pt, she did not want to give me any info regarding who monitors her ankle monitor. She asked for a return call from you when you return back in office.

## 2021-07-16 NOTE — TELEPHONE ENCOUNTER
Systemic absorption of Voltaren gel is minimal, and the percentage of alcohol in the gel is very low. It is very unlikely the gel caused the ankle monitor to go off unless she applied the gel to her ankle where she wears the monitor. Can I speak with someone/representative monitoring the ankle device please???  Thank you!     Ronit Mcmillan MD

## 2021-07-19 ENCOUNTER — TELEPHONE (OUTPATIENT)
Dept: INTERNAL MEDICINE | Age: 56
End: 2021-07-19

## 2021-07-19 NOTE — TELEPHONE ENCOUNTER
----- Message from Rito Jackson sent at 7/19/2021  1:15 PM EDT -----  Subject: Message to Provider    QUESTIONS  Information for Provider? patient would like to speak with provider   regarding paperwork that she needed patient stated the provider asked her   to call in   ---------------------------------------------------------------------------  --------------  [address]  What is the best way for the office to contact you? OK to leave message on   voicemail  Preferred Call Back Phone Number? [phone]  ---------------------------------------------------------------------------  --------------  SCRIPT ANSWERS  Relationship to Patient?  Self

## 2021-07-20 NOTE — TELEPHONE ENCOUNTER
Spoke with patient over the phone. At this point apparently all patient needs and I agree to document is that I am her provider and I am prescribing Voltaren gel which contains alcohol. Letter stating this was prepared and signed, letter will be mailed to patient as she requested.

## 2021-07-27 ENCOUNTER — TELEPHONE (OUTPATIENT)
Dept: INTERNAL MEDICINE | Age: 56
End: 2021-07-27

## 2021-07-27 NOTE — TELEPHONE ENCOUNTER
Heena Almaraz, [facility], would like you to call him regarding a letter you wrote regarding Voltaren Gel. He would like you to elaborate to whether or not there is enough alcohol in the Gel to set off her ankle monitor? Her ankle monitor tested positive for alcohol. His number is [phone]    Thank you.

## 2021-09-24 DIAGNOSIS — M51.36 DEGENERATION OF LUMBAR INTERVERTEBRAL DISC: ICD-10-CM

## 2021-09-24 DIAGNOSIS — E78.2 MIXED HYPERLIPIDEMIA: ICD-10-CM

## 2021-09-24 DIAGNOSIS — I25.10 CORONARY ARTERY DISEASE INVOLVING NATIVE CORONARY ARTERY OF NATIVE HEART WITHOUT ANGINA PECTORIS: ICD-10-CM

## 2021-09-24 DIAGNOSIS — J44.9 ASTHMA-CHRONIC OBSTRUCTIVE PULMONARY DISEASE OVERLAP SYNDROME (HCC): ICD-10-CM

## 2021-09-24 DIAGNOSIS — M96.1 LUMBAR POST-LAMINECTOMY SYNDROME: ICD-10-CM

## 2021-09-24 DIAGNOSIS — E03.9 HYPOTHYROIDISM, UNSPECIFIED TYPE: ICD-10-CM

## 2021-09-24 DIAGNOSIS — M47.812 CERVICAL SPONDYLOSIS WITHOUT MYELOPATHY: ICD-10-CM

## 2021-09-24 DIAGNOSIS — M54.50 CHRONIC LOW BACK PAIN, UNSPECIFIED BACK PAIN LATERALITY, UNSPECIFIED WHETHER SCIATICA PRESENT: ICD-10-CM

## 2021-09-24 DIAGNOSIS — G89.29 CHRONIC LOW BACK PAIN, UNSPECIFIED BACK PAIN LATERALITY, UNSPECIFIED WHETHER SCIATICA PRESENT: ICD-10-CM

## 2021-09-24 DIAGNOSIS — M47.817 LUMBOSACRAL SPONDYLOSIS WITHOUT MYELOPATHY: ICD-10-CM

## 2021-09-24 NOTE — TELEPHONE ENCOUNTER
Comments: lmom that pt needs to get her bw done since its been over a year. Advised refill would be sent to see if Charles Bullard can refill for 30 days    Last Office Visit (last PCP visit):   6/18/21 awv. labs ordered, not done yet. Next Visit Date:  No future appointments. **If hasn't been seen in over a year OR hasn't followed up according to last diabetes/ADHD visit, make appointment for patient before sending refill to provider.     Rx requested:  Requested Prescriptions     Pending Prescriptions Disp Refills    levothyroxine (SYNTHROID) 25 MCG tablet 90 tablet 0     Sig: Take 1 tablet by mouth Daily    DULoxetine (CYMBALTA) 20 MG extended release capsule 90 capsule 1     Sig: Take 1 capsule by mouth daily    clopidogrel (PLAVIX) 75 MG tablet 90 tablet 0     Sig: Take 1 tablet by mouth daily    carvedilol (COREG) 6.25 MG tablet 180 tablet 0     Sig: Take 1 tablet by mouth 2 times daily (with meals)    albuterol sulfate HFA (VENTOLIN HFA) 108 (90 Base) MCG/ACT inhaler       Sig: Ventolin HFA 90 mcg/actuation aerosol inhaler   2 puffs prn    atorvastatin (LIPITOR) 80 MG tablet 90 tablet 0     Sig: atorvastatin 80 mg tablet

## 2021-09-30 RX ORDER — ATORVASTATIN CALCIUM 80 MG/1
TABLET, FILM COATED ORAL
Qty: 30 TABLET | Refills: 0 | Status: SHIPPED | OUTPATIENT
Start: 2021-09-30 | End: 2022-05-05 | Stop reason: SDUPTHER

## 2021-09-30 RX ORDER — ALBUTEROL SULFATE 90 UG/1
AEROSOL, METERED RESPIRATORY (INHALATION)
Qty: 1 EACH | Refills: 0 | Status: SHIPPED | OUTPATIENT
Start: 2021-09-30

## 2021-09-30 RX ORDER — CLOPIDOGREL BISULFATE 75 MG/1
75 TABLET ORAL DAILY
Qty: 60 TABLET | Refills: 0 | Status: SHIPPED | OUTPATIENT
Start: 2021-09-30 | End: 2022-05-05 | Stop reason: SDUPTHER

## 2021-09-30 RX ORDER — LEVOTHYROXINE SODIUM 0.03 MG/1
25 TABLET ORAL DAILY
Qty: 15 TABLET | Refills: 0 | Status: SHIPPED | OUTPATIENT
Start: 2021-09-30 | End: 2021-10-05 | Stop reason: SDUPTHER

## 2021-09-30 RX ORDER — DULOXETIN HYDROCHLORIDE 20 MG/1
20 CAPSULE, DELAYED RELEASE ORAL DAILY
Qty: 60 CAPSULE | Refills: 0 | Status: SHIPPED | OUTPATIENT
Start: 2021-09-30 | End: 2022-05-05 | Stop reason: SDUPTHER

## 2021-09-30 RX ORDER — CARVEDILOL 6.25 MG/1
6.25 TABLET ORAL 2 TIMES DAILY WITH MEALS
Qty: 60 TABLET | Refills: 0 | Status: SHIPPED | OUTPATIENT
Start: 2021-09-30 | End: 2022-05-05 | Stop reason: SDUPTHER

## 2021-09-30 NOTE — TELEPHONE ENCOUNTER
Patient states she will have the blood work done tomorrow and would like to know if she can get 5 levothyroxine to hold her til her appointment?     Thank you

## 2021-10-02 LAB
ALBUMIN: 4.3 G/DL (ref 3.4–5)
ALP BLD-CCNC: 30 U/L (ref 33–110)
ALT SERPL-CCNC: 8 U/L (ref 7–45)
ANION GAP SERPL CALCULATED.3IONS-SCNC: 12 MMOL/L (ref 10–20)
AST SERPL-CCNC: 12 U/L (ref 9–39)
BASOPHILS # BLD: 0.03 X10E9/L (ref 0–0.1)
BASOPHILS RELATIVE PERCENT: 0.3 % (ref 0–2)
BICARBONATE: 26 MMOL/L (ref 21–32)
BILIRUB SERPL-MCNC: 0.3 MG/DL (ref 0–1.2)
CALCIUM SERPL-MCNC: 9.9 MG/DL (ref 8.6–10.3)
CHLORIDE BLD-SCNC: 106 MMOL/L (ref 98–107)
CHOLESTEROL/HDL RATIO: 4.6
CHOLESTEROL: 248 MG/DL (ref 0–199)
CREAT SERPL-MCNC: 1.03 MG/DL (ref 0.5–1)
EOSINOPHIL # BLD: 0.18 X10E9/L (ref 0–0.7)
EOSINOPHILS RELATIVE PERCENT: 1.9 % (ref 0–6)
ERYTHROCYTE [DISTWIDTH] IN BLOOD BY AUTOMATED COUNT: 13.4 % (ref 11.5–14)
GFR AFRICAN AMERICAN: 67 ML/MIN/1.73M2
GFR NON-AFRICAN AMERICAN: 55 ML/MIN/1.73M2
GLUCOSE: 86 MG/DL (ref 74–99)
HCT VFR BLD CALC: 44 % (ref 36–46)
HDLC SERPL-MCNC: 54.4 MG/DL
HEMOGLOBIN: 13.9 G/DL (ref 12–16)
IMMATURE GRANULOCYTES %: 0.3 % (ref 0–0.9)
LDL CHOLESTEROL: 152 MG/DL (ref 0–99)
LYMPHOCYTES # BLD: 30.1 % (ref 13–44)
LYMPHOCYTES RELATIVE PERCENT: 2.92 X10E9/L (ref 1.2–4.8)
MCHC RBC AUTO-ENTMCNC: 31.6 G/DL (ref 32–36)
MCV RBC AUTO: 95 FL (ref 80–100)
MONOCYTES # BLD: 0.81 X10E9/L (ref 0.1–1)
MONOCYTES RELATIVE PERCENT: 8.3 % (ref 2–10)
NEUTROPHILS RELATIVE PERCENT: 59.1 % (ref 40–80)
NEUTROPHILS: 5.74 X10E9/L (ref 1.2–7.7)
NON-HDL CHOLESTEROL: 194 MG/DL
PLATELET # BLD: 266 X10E9/L (ref 150–450)
POTASSIUM SERPL-SCNC: 5.1 MMOL/L (ref 3.5–5.3)
RBC # BLD: 4.61 X10E12/L (ref 4–5.2)
SODIUM BLD-SCNC: 139 MMOL/L (ref 136–145)
T4 FREE: 1.12 NG/DL (ref 0.61–1.1)
TOTAL PROTEIN: 7.3 G/DL (ref 6.4–8.2)
TRIGL SERPL-MCNC: 209 MG/DL (ref 0–149)
UREA NITROGEN: 26 MG/DL (ref 6–23)
VLDLC SERPL CALC-MCNC: 42 MG/DL (ref 0–40)
WBC: 9.7 X10E9/L (ref 4.4–11.3)

## 2021-10-05 DIAGNOSIS — E03.9 HYPOTHYROIDISM, UNSPECIFIED TYPE: ICD-10-CM

## 2021-10-05 LAB — TSH SERPL DL<=0.05 MIU/L-ACNC: 3.18 MIU/L (ref 0.44–3.9)

## 2021-10-05 RX ORDER — LEVOTHYROXINE SODIUM 0.03 MG/1
25 TABLET ORAL DAILY
Qty: 90 TABLET | Refills: 1 | Status: SHIPPED | OUTPATIENT
Start: 2021-10-05 | End: 2022-05-05 | Stop reason: SDUPTHER

## 2022-05-02 ENCOUNTER — TELEPHONE (OUTPATIENT)
Dept: INTERNAL MEDICINE | Age: 57
End: 2022-05-02

## 2022-05-02 NOTE — TELEPHONE ENCOUNTER
----- Message from Frannie Flores sent at 5/2/2022 12:58 PM EDT -----  Subject: Refill Request    QUESTIONS  Name of Medication? levothyroxine (SYNTHROID) 25 MCG tablet  Patient-reported dosage and instructions? 25MG one tablet daily   How many days do you have left? 3  Preferred Pharmacy? [id number]  Pharmacy phone number (if available)? [phone]  ---------------------------------------------------------------------------  --------------,  Name of Medication? clopidogrel (PLAVIX) 75 MG tablet  Patient-reported dosage and instructions? 75MG one tablet DAILY   How many days do you have left? 7  Preferred Pharmacy? [id number]  Pharmacy phone number (if available)? [phone]  ---------------------------------------------------------------------------  --------------  Jazmine Leigh INFO  What is the best way for the office to contact you? OK to leave message on   voicemail  Preferred Call Back Phone Number? [phone]  ---------------------------------------------------------------------------  --------------  SCRIPT ANSWERS  Relationship to Patient?  Self

## 2022-05-04 ENCOUNTER — HOSPITAL ENCOUNTER (OUTPATIENT)
Dept: LAB | Age: 57
Discharge: HOME | End: 2022-05-04
Payer: COMMERCIAL

## 2022-05-04 LAB
ALBUMIN SERPL-MCNC: 4.4 G/DL (ref 3.5–4.6)
ANION GAP SERPL CALCULATED.3IONS-SCNC: 15 MEQ/L (ref 9–15)
BACTERIA: ABNORMAL /HPF
BILIRUBIN URINE: NEGATIVE
BLOOD, URINE: ABNORMAL
BUN BLDV-MCNC: 14 MG/DL (ref 6–20)
CALCIUM SERPL-MCNC: 9.4 MG/DL (ref 8.5–9.9)
CHLORIDE BLD-SCNC: 104 MEQ/L (ref 95–107)
CLARITY: ABNORMAL
CO2: 22 MEQ/L (ref 20–31)
COLOR: YELLOW
CREAT SERPL-MCNC: 1.14 MG/DL (ref 0.5–0.9)
CREATININE URINE: 158.5 MG/DL
EPITHELIAL CELLS, UA: ABNORMAL /HPF (ref 0–5)
GFR AFRICAN AMERICAN: 59.5
GFR NON-AFRICAN AMERICAN: 49.2
GLUCOSE BLD-MCNC: 80 MG/DL (ref 70–99)
GLUCOSE URINE: NEGATIVE MG/DL
HCT VFR BLD CALC: 40.5 % (ref 37–47)
HEMOGLOBIN: 13.5 G/DL (ref 12–16)
HYALINE CASTS: ABNORMAL /HPF (ref 0–5)
KETONES, URINE: ABNORMAL MG/DL
LEUKOCYTE ESTERASE, URINE: ABNORMAL
MCH RBC QN AUTO: 31.2 PG (ref 27–31.3)
MCHC RBC AUTO-ENTMCNC: 33.2 % (ref 33–37)
MCV RBC AUTO: 94 FL (ref 82–100)
NITRITE, URINE: NEGATIVE
PDW BLD-RTO: 15.6 % (ref 11.5–14.5)
PH UA: 5 (ref 5–9)
PHOSPHORUS: 4.6 MG/DL (ref 2.3–4.8)
PLATELET # BLD: 299 K/UL (ref 130–400)
POTASSIUM SERPL-SCNC: 5.6 MEQ/L (ref 3.4–4.9)
PROTEIN PROTEIN: 154 MG/DL
PROTEIN UA: 100 MG/DL
PROTEIN/CREAT RATIO: 1 ML/ML
PROTEIN/CREAT RATIO: 1 ML/ML (ref 0–0.2)
RBC # BLD: 4.31 M/UL (ref 4.2–5.4)
RBC UA: ABNORMAL /HPF (ref 0–5)
SODIUM BLD-SCNC: 141 MEQ/L (ref 135–144)
SPECIFIC GRAVITY UA: 1.02 (ref 1–1.03)
UROBILINOGEN, URINE: 1 E.U./DL
WBC # BLD: 8.5 K/UL (ref 4.8–10.8)
WBC UA: ABNORMAL /HPF (ref 0–5)

## 2022-05-04 PROCEDURE — 80069 RENAL FUNCTION PANEL: CPT

## 2022-05-04 PROCEDURE — 84156 ASSAY OF PROTEIN URINE: CPT

## 2022-05-04 PROCEDURE — 81001 URINALYSIS AUTO W/SCOPE: CPT

## 2022-05-04 PROCEDURE — 85027 COMPLETE CBC AUTOMATED: CPT

## 2022-05-04 PROCEDURE — 36415 COLL VENOUS BLD VENIPUNCTURE: CPT

## 2022-05-04 NOTE — TELEPHONE ENCOUNTER
Comments:    Last Office Visit (last PCP visit):   Visit date not found    Next Visit Date:  Future Appointments   Date Time Provider Teresa Valente   5/5/2022  8:00  [facility]       **If hasn't been seen in over a year OR hasn't followed up according to last diabetes/ADHD visit, make appointment for patient before sending refill to provider.     Rx requested:  Requested Prescriptions      No prescriptions requested or ordered in this encounter

## 2022-05-05 ENCOUNTER — OFFICE VISIT (OUTPATIENT)
Dept: INTERNAL MEDICINE | Age: 57
End: 2022-05-05
Payer: MEDICARE

## 2022-05-05 VITALS
HEART RATE: 98 BPM | BODY MASS INDEX: 34.36 KG/M2 | SYSTOLIC BLOOD PRESSURE: 136 MMHG | OXYGEN SATURATION: 98 % | WEIGHT: 175 LBS | HEIGHT: 60 IN | DIASTOLIC BLOOD PRESSURE: 76 MMHG

## 2022-05-05 DIAGNOSIS — J44.9 ASTHMA-CHRONIC OBSTRUCTIVE PULMONARY DISEASE OVERLAP SYNDROME (HCC): ICD-10-CM

## 2022-05-05 DIAGNOSIS — F41.9 ANXIETY: ICD-10-CM

## 2022-05-05 DIAGNOSIS — G89.29 CHRONIC LOW BACK PAIN, UNSPECIFIED BACK PAIN LATERALITY, UNSPECIFIED WHETHER SCIATICA PRESENT: ICD-10-CM

## 2022-05-05 DIAGNOSIS — E78.2 MIXED HYPERLIPIDEMIA: ICD-10-CM

## 2022-05-05 DIAGNOSIS — E03.9 HYPOTHYROIDISM, UNSPECIFIED TYPE: ICD-10-CM

## 2022-05-05 DIAGNOSIS — M54.50 CHRONIC LOW BACK PAIN, UNSPECIFIED BACK PAIN LATERALITY, UNSPECIFIED WHETHER SCIATICA PRESENT: ICD-10-CM

## 2022-05-05 DIAGNOSIS — F33.1 MAJOR DEPRESSIVE DISORDER, RECURRENT, MODERATE (HCC): ICD-10-CM

## 2022-05-05 DIAGNOSIS — N18.30 STAGE 3 CHRONIC KIDNEY DISEASE, UNSPECIFIED WHETHER STAGE 3A OR 3B CKD (HCC): ICD-10-CM

## 2022-05-05 DIAGNOSIS — Z87.891 PERSONAL HISTORY OF TOBACCO USE: ICD-10-CM

## 2022-05-05 DIAGNOSIS — M51.36 DEGENERATION OF LUMBAR INTERVERTEBRAL DISC: ICD-10-CM

## 2022-05-05 DIAGNOSIS — M96.1 LUMBAR POST-LAMINECTOMY SYNDROME: ICD-10-CM

## 2022-05-05 DIAGNOSIS — M47.817 LUMBOSACRAL SPONDYLOSIS WITHOUT MYELOPATHY: ICD-10-CM

## 2022-05-05 DIAGNOSIS — F10.99 ALCOHOL USE, UNSPECIFIED WITH UNSPECIFIED ALCOHOL-INDUCED DISORDER (HCC): ICD-10-CM

## 2022-05-05 DIAGNOSIS — M47.812 CERVICAL SPONDYLOSIS WITHOUT MYELOPATHY: ICD-10-CM

## 2022-05-05 DIAGNOSIS — M46.1 INFLAMMATION OF SACROILIAC JOINT (HCC): ICD-10-CM

## 2022-05-05 DIAGNOSIS — I10 ESSENTIAL HYPERTENSION: Primary | ICD-10-CM

## 2022-05-05 DIAGNOSIS — Z12.31 ENCOUNTER FOR SCREENING MAMMOGRAM FOR MALIGNANT NEOPLASM OF BREAST: ICD-10-CM

## 2022-05-05 DIAGNOSIS — I25.10 CORONARY ARTERY DISEASE INVOLVING NATIVE CORONARY ARTERY OF NATIVE HEART WITHOUT ANGINA PECTORIS: ICD-10-CM

## 2022-05-05 PROCEDURE — G8417 CALC BMI ABV UP PARAM F/U: HCPCS | Performed by: PHYSICIAN ASSISTANT

## 2022-05-05 PROCEDURE — 99214 OFFICE O/P EST MOD 30 MIN: CPT | Performed by: PHYSICIAN ASSISTANT

## 2022-05-05 PROCEDURE — 3023F SPIROM DOC REV: CPT | Performed by: PHYSICIAN ASSISTANT

## 2022-05-05 PROCEDURE — 4004F PT TOBACCO SCREEN RCVD TLK: CPT | Performed by: PHYSICIAN ASSISTANT

## 2022-05-05 PROCEDURE — G8427 DOCREV CUR MEDS BY ELIG CLIN: HCPCS | Performed by: PHYSICIAN ASSISTANT

## 2022-05-05 PROCEDURE — 3017F COLORECTAL CA SCREEN DOC REV: CPT | Performed by: PHYSICIAN ASSISTANT

## 2022-05-05 PROCEDURE — 99406 BEHAV CHNG SMOKING 3-10 MIN: CPT | Performed by: PHYSICIAN ASSISTANT

## 2022-05-05 RX ORDER — FLUTICASONE PROPIONATE 50 MCG
SPRAY, SUSPENSION (ML) NASAL
COMMUNITY
End: 2022-05-05 | Stop reason: SDUPTHER

## 2022-05-05 RX ORDER — DULOXETIN HYDROCHLORIDE 20 MG/1
20 CAPSULE, DELAYED RELEASE ORAL DAILY
Qty: 30 CAPSULE | Refills: 5 | Status: SHIPPED | OUTPATIENT
Start: 2022-05-05 | End: 2022-07-15

## 2022-05-05 RX ORDER — CLOPIDOGREL BISULFATE 75 MG/1
75 TABLET ORAL DAILY
Qty: 30 TABLET | Refills: 5 | Status: SHIPPED | OUTPATIENT
Start: 2022-05-05

## 2022-05-05 RX ORDER — CARVEDILOL 6.25 MG/1
6.25 TABLET ORAL 2 TIMES DAILY WITH MEALS
Qty: 60 TABLET | Refills: 5 | Status: SHIPPED | OUTPATIENT
Start: 2022-05-05

## 2022-05-05 RX ORDER — METOCLOPRAMIDE 10 MG/1
TABLET ORAL
COMMUNITY
Start: 2021-10-28 | End: 2022-07-15 | Stop reason: DRUGHIGH

## 2022-05-05 RX ORDER — LEVOTHYROXINE SODIUM 0.03 MG/1
25 TABLET ORAL DAILY
Qty: 30 TABLET | Refills: 5 | Status: SHIPPED | OUTPATIENT
Start: 2022-05-05

## 2022-05-05 RX ORDER — ATORVASTATIN CALCIUM 80 MG/1
TABLET, FILM COATED ORAL
Qty: 30 TABLET | Refills: 5 | Status: SHIPPED | OUTPATIENT
Start: 2022-05-05

## 2022-05-05 RX ORDER — FLUTICASONE PROPIONATE 50 MCG
2 SPRAY, SUSPENSION (ML) NASAL DAILY
Qty: 16 G | Refills: 5 | Status: SHIPPED | OUTPATIENT
Start: 2022-05-05

## 2022-05-05 ASSESSMENT — ENCOUNTER SYMPTOMS
RESPIRATORY NEGATIVE: 1
EYES NEGATIVE: 1

## 2022-05-05 ASSESSMENT — PATIENT HEALTH QUESTIONNAIRE - PHQ9
SUM OF ALL RESPONSES TO PHQ QUESTIONS 1-9: 0
SUM OF ALL RESPONSES TO PHQ QUESTIONS 1-9: 0
8. MOVING OR SPEAKING SO SLOWLY THAT OTHER PEOPLE COULD HAVE NOTICED. OR THE OPPOSITE, BEING SO FIGETY OR RESTLESS THAT YOU HAVE BEEN MOVING AROUND A LOT MORE THAN USUAL: 0
6. FEELING BAD ABOUT YOURSELF - OR THAT YOU ARE A FAILURE OR HAVE LET YOURSELF OR YOUR FAMILY DOWN: 0
10. IF YOU CHECKED OFF ANY PROBLEMS, HOW DIFFICULT HAVE THESE PROBLEMS MADE IT FOR YOU TO DO YOUR WORK, TAKE CARE OF THINGS AT HOME, OR GET ALONG WITH OTHER PEOPLE: 0
2. FEELING DOWN, DEPRESSED OR HOPELESS: 0
4. FEELING TIRED OR HAVING LITTLE ENERGY: 0
SUM OF ALL RESPONSES TO PHQ9 QUESTIONS 1 & 2: 0
5. POOR APPETITE OR OVEREATING: 0
1. LITTLE INTEREST OR PLEASURE IN DOING THINGS: 0
SUM OF ALL RESPONSES TO PHQ QUESTIONS 1-9: 0
7. TROUBLE CONCENTRATING ON THINGS, SUCH AS READING THE NEWSPAPER OR WATCHING TELEVISION: 0
9. THOUGHTS THAT YOU WOULD BE BETTER OFF DEAD, OR OF HURTING YOURSELF: 0
3. TROUBLE FALLING OR STAYING ASLEEP: 0
SUM OF ALL RESPONSES TO PHQ QUESTIONS 1-9: 0

## 2022-05-05 NOTE — PROGRESS NOTES
22    Mariaelena Middleton (: 1965) is a 64 y.o. female, Established patient, here for evaluation of the following chief complaint(s):  Medication Refill (Synthroid- has also gained wt. ) and Results (Discuss BW from last year, and recent)      HPI      Hypothroid- stable, but gained weight when out of her medication last year   HLD- on Lipitor   CAD- on statin and Plavix  Anxiety- not been on Cymbalta , has been out   HTN-  stable on Coreg   CKD- seeing specialist   Smoker, drinks about 6-10 beers a week   chronic back issues- table Cymbalta for the pain     Review of Systems   Constitutional: Negative. HENT: Negative. Eyes: Negative. Respiratory: Negative. Cardiovascular: Negative. Genitourinary: Negative. Musculoskeletal: Negative. Neurological: Negative. Psychiatric/Behavioral: Negative. Prior to Visit Medications    Medication Sig Taking?  Authorizing Provider   metoclopramide (REGLAN) 10 MG tablet Take by mouth Yes Historical Provider, MD   fluticasone (FLONASE) 50 MCG/ACT nasal spray 2 sprays by Nasal route daily Yes KWAME Hernandez   clopidogrel (PLAVIX) 75 MG tablet Take 1 tablet by mouth daily Yes KWAME Hernandez   levothyroxine (SYNTHROID) 25 MCG tablet Take 1 tablet by mouth Daily Yes KWAME Ochoa   DULoxetine (CYMBALTA) 20 MG extended release capsule Take 1 capsule by mouth daily Yes KWAME Hernandez   carvedilol (COREG) 6.25 MG tablet Take 1 tablet by mouth 2 times daily (with meals) Yes KWAME Hernandez   atorvastatin (LIPITOR) 80 MG tablet atorvastatin 80 mg tablet Yes KWAME Ochoa   albuterol sulfate HFA (VENTOLIN HFA) 108 (90 Base) MCG/ACT inhaler Ventolin HFA 90 mcg/actuation aerosol inhaler   2 puffs prn Yes KWAME Ochoa   Aspirin (ECOTRIN LOW STRENGTH PO) daily  Yes Historical Provider, MD   albuterol (PROVENTIL) (2.5 MG/3ML) 0.083% nebulizer solution Inhale 2.5 mg into the lungs  Yes Historical Provider, MD   nitroGLYCERIN (NITROSTAT) 0.4 MG SL tablet nitroglycerin 0.4 mg sublingual tablet Yes Historical Provider, MD   diclofenac sodium (VOLTAREN) 1 % GEL Apply 4 g topically 2 times daily Yes Aisha Carrillo MD        Allergies   Allergen Reactions    Gentamicin Anaphylaxis    Codeine Itching     Other reaction(s): Itching       Social History     Socioeconomic History    Marital status: Single     Spouse name: Not on file    Number of children: Not on file    Years of education: Not on file    Highest education level: Not on file   Occupational History    Not on file   Tobacco Use    Smoking status: Current Every Day Smoker     Packs/day: 1.00     Years: 40.00     Pack years: 40.00     Types: Cigarettes     Start date: 12/4/1976    Smokeless tobacco: Never Used   Vaping Use    Vaping Use: Former   Substance and Sexual Activity    Alcohol use: Yes     Alcohol/week: 10.0 standard drinks     Types: 10 Cans of beer per week    Drug use: Not Currently     Types: Marijuana Curlie Opal)    Sexual activity: Not Currently   Other Topics Concern    Not on file   Social History Narrative    Not on file     Social Determinants of Health     Financial Resource Strain: Low Risk     Difficulty of Paying Living Expenses: Not hard at all   Food Insecurity: No Food Insecurity    Worried About [facility] in the Last Year: Never true    [facility] in the Last Year: Never true   Transportation Needs:     Lack of Transportation (Medical): Not on file    Lack of Transportation (Non-Medical):  Not on file   Physical Activity:     Days of Exercise per Week: Not on file    Minutes of Exercise per Session: Not on file   Stress:     Feeling of Stress : Not on file   Social Connections:     Frequency of Communication with Friends and Family: Not on file    Frequency of Social Gatherings with Friends and Family: Not on file    Attends Anabaptist Services: Not on file   [employer] of Clubs or Organizations: Not on file    Attends Club or Organization Meetings: Not on file    Marital Status: Not on file   Intimate Partner Violence:     Fear of Current or Ex-Partner: Not on file    Emotionally Abused: Not on file    Physically Abused: Not on file    Sexually Abused: Not on file   Housing Stability:     Unable to Pay for Housing in the Last Year: Not on file    Number of [place] in the Last Year: Not on file    Unstable Housing in the Last Year: Not on file       Vitals:    05/05/22 0815   BP: 136/76   Site: Left Upper Arm   Position: Sitting   Cuff Size: Large Adult   Pulse: 98   SpO2: 98%   Weight: 175 lb (79.4 kg)   Height: 5' (1.524 m)        Physical Exam  Vitals reviewed. Constitutional:       Appearance: Normal appearance. HENT:      Head: Normocephalic and atraumatic. Eyes:      Extraocular Movements: Extraocular movements intact. Conjunctiva/sclera: Conjunctivae normal.      Pupils: Pupils are equal, round, and reactive to light. Cardiovascular:      Rate and Rhythm: Normal rate and regular rhythm. Heart sounds: Normal heart sounds. Pulmonary:      Effort: Pulmonary effort is normal.      Breath sounds: Normal breath sounds. Musculoskeletal:         General: Normal range of motion. Skin:     General: Skin is warm. Neurological:      Mental Status: She is alert and oriented to person, place, and time. Psychiatric:         Mood and Affect: Mood normal.         Behavior: Behavior normal.         Thought Content: Thought content normal.         Judgment: Judgment normal.               ASSESSMENT/PLAN:  1. Essential hypertension  -Controlled  -Continue Coreg    2. Mixed hyperlipidemia  -Stable, continue Lipitor  - atorvastatin (LIPITOR) 80 MG tablet; atorvastatin 80 mg tablet  Dispense: 30 tablet; Refill: 5    3.  Coronary artery disease involving native coronary artery of native heart without angina pectoris  -Stable, continue current medications  - clopidogrel (PLAVIX) 75 MG tablet; Take 1 tablet by mouth daily  Dispense: 30 tablet; Refill: 5  - carvedilol (COREG) 6.25 MG tablet; Take 1 tablet by mouth 2 times daily (with meals)  Dispense: 60 tablet; Refill: 5    4. Hypothyroidism, unspecified type  -Controlled on current dose of Synthroid, continue  - levothyroxine (SYNTHROID) 25 MCG tablet; Take 1 tablet by mouth Daily  Dispense: 30 tablet; Refill: 5    5. Major depressive disorder, recurrent, moderate (HCC)  6. Anxiety  -Stable on Cymbalta, continue  - DULoxetine (CYMBALTA) 20 MG extended release capsule; Take 1 capsule by mouth daily  Dispense: 30 capsule; Refill: 5    7. Stage 3 chronic kidney disease, unspecified whether stage 3a or 3b CKD (HCC)  -Current GFR 49.2  -We will continue to monitor, sees     8. Lumbosacral spondylosis without myelopathy  9. Lumbar post-laminectomy syndrome   10. Degeneration of lumbar intervertebral disc  11. Chronic low back pain, unspecified back pain laterality, unspecified whether sciatica present  12. Cervical spondylosis without myelopathy  - DULoxetine (CYMBALTA) 20 MG extended release capsule; Take 1 capsule by mouth daily  Dispense: 30 capsule; Refill: 5    13. Personal history of tobacco use  - [place] TOBACCO USE CESSATION INTERMEDIATE 3-10 MINUTES  Tobacco Cessation Counseling: Patient advised about behavior change, including information about personal health harms, usage of appropriate cessation measures and benefits of cessation. Time spent (minutes): 5    14. Inflammation of sacroiliac joint ([facility] [address]  - continue Cymbalta     15. Asthma-chronic obstructive pulmonary disease overlap syndrome (HCC)  - controlled, albuterol use as needed    16. Alcohol use, unspecified with unspecified alcohol-induced disorder ([address]  -Asked that she cut back    17. Encounter for screening mammogram for malignant neoplasm of breast  - [facility] DIGITAL SCREEN W OR WO CAD BILATERAL; Future          No follow-ups on file.     An  electronic signature was used to authenticate this note.     Electronically signed by [address] [place] [address] PA on 5/13/2022 at 8:02 AM

## 2022-05-19 ENCOUNTER — OFFICE VISIT (OUTPATIENT)
Dept: INTERNAL MEDICINE | Age: 57
End: 2022-05-19
Payer: MEDICARE

## 2022-05-19 VITALS
DIASTOLIC BLOOD PRESSURE: 68 MMHG | HEIGHT: 60 IN | OXYGEN SATURATION: 98 % | TEMPERATURE: 97.2 F | HEART RATE: 93 BPM | WEIGHT: 170 LBS | SYSTOLIC BLOOD PRESSURE: 124 MMHG | BODY MASS INDEX: 33.38 KG/M2

## 2022-05-19 DIAGNOSIS — I25.10 CORONARY ARTERY DISEASE INVOLVING NATIVE CORONARY ARTERY OF NATIVE HEART WITHOUT ANGINA PECTORIS: ICD-10-CM

## 2022-05-19 DIAGNOSIS — N18.31 STAGE 3A CHRONIC KIDNEY DISEASE (HCC): ICD-10-CM

## 2022-05-19 DIAGNOSIS — E66.09 CLASS 1 OBESITY DUE TO EXCESS CALORIES WITH SERIOUS COMORBIDITY AND BODY MASS INDEX (BMI) OF 33.0 TO 33.9 IN ADULT: Primary | ICD-10-CM

## 2022-05-19 PROCEDURE — G8427 DOCREV CUR MEDS BY ELIG CLIN: HCPCS | Performed by: NURSE PRACTITIONER

## 2022-05-19 PROCEDURE — 99214 OFFICE O/P EST MOD 30 MIN: CPT | Performed by: NURSE PRACTITIONER

## 2022-05-19 PROCEDURE — 4004F PT TOBACCO SCREEN RCVD TLK: CPT | Performed by: NURSE PRACTITIONER

## 2022-05-19 PROCEDURE — 3017F COLORECTAL CA SCREEN DOC REV: CPT | Performed by: NURSE PRACTITIONER

## 2022-05-19 PROCEDURE — G8417 CALC BMI ABV UP PARAM F/U: HCPCS | Performed by: NURSE PRACTITIONER

## 2022-05-19 RX ORDER — SEMAGLUTIDE 1.34 MG/ML
INJECTION, SOLUTION SUBCUTANEOUS
Qty: 1 PEN | Refills: 0 | COMMUNITY
Start: 2022-05-19

## 2022-05-19 RX ORDER — SEMAGLUTIDE 1 MG/.5ML
1 INJECTION, SOLUTION SUBCUTANEOUS
Qty: 2 ML | Refills: 1 | Status: SHIPPED | OUTPATIENT
Start: 2022-05-19

## 2022-05-19 ASSESSMENT — ENCOUNTER SYMPTOMS: SHORTNESS OF BREATH: 1

## 2022-05-19 NOTE — PROGRESS NOTES
[address]  PRIMARY CARE  [address]  [address] [place] [address]  Dept: [phone]  Dept Fax: [phone]: [phone] [id number] Naynaa Pearson (: 1965) is a 64 y.o. female, Established patient, here for evaluation of the following chief complaint(s):  New Patient (diet help per Danielle Schultz)      PCP:  KWAME Tripathi      Pt here today d/t concerns over her weight. She put on 30 lbs in month and half was out of thyroid medicine end of last summer and can't get it off. Has rods in her back, so exercise is difficult. Feels more sob with activity d/t the weight gain. Her dtr is on adipex and doing well. Pt wants to try that. Review of Systems   Constitutional: Positive for unexpected weight change (30 lbs gain). Respiratory: Positive for shortness of breath. Cardiovascular: Negative for chest pain. Musculoskeletal: Positive for arthralgias. Past Medical History:   Diagnosis Date    Chronic back pain     H/O foot surgery     bunion removal    Heart attack ([facility] [address]     Hyperlipidemia     Hypertension     Hypothyroidism     Kidney disease      Past Surgical History:   Procedure Laterality Date    BACK SURGERY  [id number]    BUNIONECTOMY      LUNG SURGERY      tube placed due to collapsed lung     Social History     Socioeconomic History    Marital status: Single     Spouse name: Not on file    Number of children: Not on file    Years of education: Not on file    Highest education level: Not on file   Occupational History    Not on file   Tobacco Use    Smoking status: Current Every Day Smoker     Packs/day: 1.00     Years: 40.00     Pack years: 40.00     Types: Cigarettes     Start date: 1976    Smokeless tobacco: Never Used   Vaping Use    Vaping Use: Former   Substance and Sexual Activity    Alcohol use:  Yes     Alcohol/week: 10.0 standard drinks     Types: 10 Cans OCTAVIANO - CNP   Semaglutide,0.25 or 0.5MG/DOS, (OZEMPIC, 0.25 OR 0.5 MG/DOSE,) 2 MG/1.5ML SOPN Sample Lot [id number] Exp 11/30/24 5/19/22  Yes OCTAVIANO Ramirez CNP   metoclopramide (REGLAN) 10 MG tablet Take by mouth 10/28/21  Yes Historical Provider, MD   fluticasone (FLONASE) 50 MCG/ACT nasal spray 2 sprays by Nasal route daily 5/5/22  Yes KWAME Payne   clopidogrel (PLAVIX) 75 MG tablet Take 1 tablet by mouth daily 5/5/22  Yes KWAME Payne   levothyroxine (SYNTHROID) 25 MCG tablet Take 1 tablet by mouth Daily 5/5/22  Yes KWAME Payne   DULoxetine (CYMBALTA) 20 MG extended release capsule Take 1 capsule by mouth daily 5/5/22  Yes KWAME Payne   carvedilol (COREG) 6.25 MG tablet Take 1 tablet by mouth 2 times daily (with meals) 5/5/22  Yes KWAME Payne   atorvastatin (LIPITOR) 80 MG tablet atorvastatin 80 mg tablet 5/5/22  Yes KWAME Jenkins   albuterol sulfate HFA (VENTOLIN HFA) 108 (90 Base) MCG/ACT inhaler Ventolin HFA 90 mcg/actuation aerosol inhaler   2 puffs prn 9/30/21  Yes KWAME Jenkins   Aspirin (ECOTRIN LOW STRENGTH PO) daily    Yes Historical Provider, MD   albuterol (PROVENTIL) (2.5 MG/3ML) 0.083% nebulizer solution Inhale 2.5 mg into the lungs  1/8/19  Yes Historical Provider, MD   nitroGLYCERIN (NITROSTAT) 0.4 MG SL tablet nitroglycerin 0.4 mg sublingual tablet 10/10/18  Yes Historical Provider, MD   diclofenac sodium (VOLTAREN) 1 % GEL Apply 4 g topically 2 times daily 12/4/20  Yes Tory Cisneros MD                I have reviewed the patient's medical history in detail and updated the computerized patient record. OBJECTIVE    Vitals:    05/19/22 1152   BP: 124/68   Site: Right Upper Arm   Position: Sitting   Cuff Size: Medium Adult   Pulse: 93   Temp: 97.2 °F (36.2 °C)   TempSrc: Infrared   SpO2: 98%   Weight: 170 lb (77.1 kg)   Height: 5' (1.524 m)       Physical Exam  Vitals and nursing note reviewed.    Constitutional:       General: She is not in acute distress. Appearance: Normal appearance. She is obese. Cardiovascular:      Rate and Rhythm: Normal rate and regular rhythm. Heart sounds: Normal heart sounds. Pulmonary:      Effort: Pulmonary effort is normal. No respiratory distress. Breath sounds: Normal breath sounds. Musculoskeletal:      Cervical back: Normal range of motion and neck supple. Lymphadenopathy:      Cervical: No cervical adenopathy. Skin:     General: Skin is warm and dry. Neurological:      Mental Status: She is alert and oriented to person, place, and time. Psychiatric:         Mood and Affect: Mood normal.         Thought Content: Thought content normal.           ASSESSMENT/ PLAN    1. Class 1 obesity due to excess calories with serious comorbidity and body mass index (BMI) of 33.0 to 33.9 in adult  -chronic, worse  -is not a candidate for adipex d/t coronary artery disease. Explained to pt.   -she is really wanting to try something. Feel wegovy is good option for her, discussed how works, how to use, etc. Not sure if insurance will cover. Did give her sample pen of ozempic to get her started on this (same molecule, diff branded med)  -if not approved, did discuss plenity as option, aware of oop monthly cost. She will consider  -try to do more activity like swimming d/t her back can't do much  -diet is tricky as well, discussed importance of [employer], no white flour, sugar or white rice. She is also supposed to be on a renal diet and was told only white rice and noodles no grains, no red meat and no high protein style diet. Discussed some options for her  - Semaglutide-Weight Management (WEGOVY) 1 MG/0.5ML SOAJ SC injection; Inject 1 mg into the skin every 7 days  Dispense: 2 mL; Refill: 1    2. Stage 3a chronic kidney disease (HCC)  -chronic, stable, continue to avoid nephrotoxic agents  - Semaglutide-Weight Management (WEGOVY) 1 MG/0.5ML SOAJ SC injection;  Inject 1 mg into the skin every 7 days  Dispense: 2 mL; Refill: 1    3. Coronary artery disease involving native coronary artery of native heart without angina pectoris  -chronic, stable disqualifies from adipex for weight loss   - Semaglutide-Weight Management (WEGOVY) 1 MG/0.5ML SOAJ SC injection; Inject 1 mg into the skin every 7 days  Dispense: 2 mL; Refill: 1          Return in about 6 weeks (around 6/30/2022) for weight management.      Electronically signed by:  OCTAVIANO Yeung - KIKA   5/19/22

## 2022-05-26 DIAGNOSIS — M47.812 CERVICAL SPONDYLOSIS WITHOUT MYELOPATHY: ICD-10-CM

## 2022-05-26 NOTE — TELEPHONE ENCOUNTER
patient requesting medication refill.  Please approve or deny this request.    Rx requested:  Requested Prescriptions     Pending Prescriptions Disp Refills    diclofenac sodium (VOLTAREN) 1 %  g 2     Sig: Apply 4 g topically 2 times daily         Last Office Visit:   5/5/2022      Next Visit Date:  Future Appointments   Date Time Provider Teresa Valente   6/30/2022 11:30 AM Wes Salas, APRN - [id number]   11/7/2022 11:00 AM ELIZABETH [address]

## 2022-06-30 ENCOUNTER — TELEPHONE (OUTPATIENT)
Dept: INTERNAL MEDICINE | Age: 57
End: 2022-06-30

## 2022-06-30 NOTE — TELEPHONE ENCOUNTER
Pt is also requesting a refill to OMEPRAZOLE I couldn't find this on med list or history. She stated Lowell Blas  initially prescribed the medication.      Thank you

## 2022-07-01 ENCOUNTER — TELEPHONE (OUTPATIENT)
Dept: INTERNAL MEDICINE | Age: 57
End: 2022-07-01

## 2022-07-12 ENCOUNTER — TELEPHONE (OUTPATIENT)
Dept: INTERNAL MEDICINE | Age: 57
End: 2022-07-12

## 2022-07-12 NOTE — TELEPHONE ENCOUNTER
----- Message from Wei sent at 7/12/2022  2:58 PM EDT -----  Subject: Medication Problem    Medication: Semaglutide-Weight Management (WEGOVY) 1 MG/0.5ML SOAJ SC   injection  Dosage: Inject 1 mg into the skin every 7 days  Ordering Provider: james    Question/Problem: pt went to pharmacy to  medication and was not   able to get it, was told that she needed preauthorization. pt asked for   callback, she is wondering if there are anymore samples of this medication   in the office that she could  while she waits.      Pharmacy: [facility] [address] [address] [facility] [address]   [phone] Tanja Correa [phone]    ---------------------------------------------------------------------------  --------------  Ky Fraction INFO  [phone]; OK to leave message on voicemail  ---------------------------------------------------------------------------  --------------    SCRIPT ANSWERS  Relationship to Patient: Self

## 2022-07-12 NOTE — TELEPHONE ENCOUNTER
\"\"pt went to pharmacy to  medication and was not   able to get it, was told that she needed preauthorization. pt asked for   callback, she is wondering if there are anymore samples of this medication   in the office that she could  while she waits.  \"\"

## 2022-07-13 NOTE — TELEPHONE ENCOUNTER
Please let her know that her insurance unfortunately doesn't cover ozempic or wegovy for the weight loss. What dose of omeprazole is she on? I can send that in for her but just wanted to clarify.

## 2022-07-14 ENCOUNTER — OFFICE VISIT (OUTPATIENT)
Dept: PAIN MANAGEMENT | Age: 57
End: 2022-07-14
Payer: MEDICARE

## 2022-07-14 VITALS
BODY MASS INDEX: 30.43 KG/M2 | WEIGHT: 155 LBS | DIASTOLIC BLOOD PRESSURE: 80 MMHG | TEMPERATURE: 97.2 F | HEIGHT: 60 IN | SYSTOLIC BLOOD PRESSURE: 138 MMHG

## 2022-07-14 DIAGNOSIS — M47.817 LUMBOSACRAL SPONDYLOSIS WITHOUT MYELOPATHY: Primary | ICD-10-CM

## 2022-07-14 DIAGNOSIS — M51.36 DEGENERATION OF LUMBAR INTERVERTEBRAL DISC: ICD-10-CM

## 2022-07-14 DIAGNOSIS — M96.1 LUMBAR POST-LAMINECTOMY SYNDROME: ICD-10-CM

## 2022-07-14 PROBLEM — E11.9 TYPE 2 DIABETES MELLITUS (HCC): Status: ACTIVE | Noted: 2022-07-14

## 2022-07-14 PROCEDURE — 3017F COLORECTAL CA SCREEN DOC REV: CPT | Performed by: PAIN MEDICINE

## 2022-07-14 PROCEDURE — G8417 CALC BMI ABV UP PARAM F/U: HCPCS | Performed by: PAIN MEDICINE

## 2022-07-14 PROCEDURE — G8427 DOCREV CUR MEDS BY ELIG CLIN: HCPCS | Performed by: PAIN MEDICINE

## 2022-07-14 PROCEDURE — 99213 OFFICE O/P EST LOW 20 MIN: CPT | Performed by: PAIN MEDICINE

## 2022-07-14 PROCEDURE — 4004F PT TOBACCO SCREEN RCVD TLK: CPT | Performed by: PAIN MEDICINE

## 2022-07-14 RX ORDER — CARISOPRODOL 350 MG/1
350 TABLET ORAL 4 TIMES DAILY PRN
COMMUNITY
End: 2022-07-14 | Stop reason: SDUPTHER

## 2022-07-14 RX ORDER — CARISOPRODOL 350 MG/1
350 TABLET ORAL 3 TIMES DAILY PRN
Qty: 84 TABLET | Refills: 0 | Status: SHIPPED | OUTPATIENT
Start: 2022-07-14 | End: 2022-08-11 | Stop reason: SDUPTHER

## 2022-07-14 ASSESSMENT — ENCOUNTER SYMPTOMS
ALLERGIC/IMMUNOLOGIC NEGATIVE: 1
RESPIRATORY NEGATIVE: 1
EYES NEGATIVE: 1
GASTROINTESTINAL NEGATIVE: 1

## 2022-07-14 NOTE — TELEPHONE ENCOUNTER
Pt aware of below message. Wants to know what the plan for weight loss would be now? Also wanted to mention that her levothyroxine seems to have made her gain 30 pounds in 30 days and also states her hair is thinning. As for the omeprazole, pt states she is not sure and she will have to check when she gets home from her 's appointment and let us know.

## 2022-07-14 NOTE — TELEPHONE ENCOUNTER
There really isn't a lot on the market for weight loss- if she is having issues with her thyroid and hair loss, she really should f/u with her PCP Ewa Alvarez. I was just supposed to see her for adipex, but she doesn't qualify for that medication and since wegovy/ozempic not covered, really don't have much for options.

## 2022-07-14 NOTE — PROGRESS NOTES
Fabrizio Medel (:  1965) is a 64 y.o. female,Established patient, here for evaluation of the following chief complaint(s):  Back Pain and Neck Pain         ASSESSMENT/PLAN:  1. Lumbosacral spondylosis without myelopathy  2. Degeneration of lumbar intervertebral disc  3. Lumbar post-laminectomy syndrome       Patient presents with complaint of back pain. Mostly in the back with occational Radiation, Had Epidurals, Laminotomies with no Relief, MRI shows Pedicle screws inserted at the L3, L4 and L5 levels bilaterally. Intervertebral spacers have been inserted at L3-4 and L4-5 levels and cause artifacts. No abnormal sites of enhancement. Last PT did not help and made it worse. Was on narcs and had a suicidal attempt with them in the past, On Plavix since 3 years for a cardiac stent,  Plan was for a stim trial pt just had a psych eval awaiting report. Will plan on stim trial aftre checking Psych eval.  Addendum/ 7/15/2022: Pt was sent to Dr Marylu Escamilla, Reports not satishfied with Care apparently because she discussed Smoking Cessation and referred her for this, How ever the conversation went into the topic of her suicidal attempt, and her statement that she likes to be closer to god, and She does not want me to contact her Psychiatrist at this time, She will find a psychiatrist and get back to me on this. Subjective   SUBJECTIVE/OBJECTIVE:  History of Present Illness    Patient Identification  Fabrizio Medel is a 64 y.o. female. Patient information was obtained from patient. History/Exam limitations: none. Patient presented to the Office ambulatory. Chief Complaint   Back Pain and Neck Pain      Patient presents with complaint of back pain. Mostly in the back with occational Radiation, Had Epidurals, Laminotomies with no Relief, MRI shows Pedicle screws inserted at the L3, L4 and L5 levels bilaterally. Intervertebral spacers have been inserted at L3-4 and L4-5 levels and cause artifacts. No abnormal sites of enhancement. Last PT did not help and made it worse. Was on narcs and had a suicidal attempt with them in the past, On Plavix since 3 years for a cardiac stent,  Plan was for a stim trial pt just had a psych eval awaiting report. Past Medical History:  No date: Chronic back pain  No date: H/O foot surgery      Comment:  bunion removal  No date: Heart attack ([address]  No date: Hyperlipidemia  No date: Hypertension  No date: Hypothyroidism  No date: Kidney disease  Review of patient's family history indicates:  Problem: Kidney Disease      Relation: Father          Age of Onset: (Not Specified)  Problem: Heart Disease      Relation: Father          Age of Onset: (Not Specified)    Current Outpatient Medications:  carisoprodol (SOMA) 350 MG tablet, Take 350 mg by mouth 4 times daily as needed for Muscle spasms. , Disp: , Rfl:   diclofenac sodium (VOLTAREN) 1 % GEL, Apply 4 g topically 2 times daily, Disp: 150 g, Rfl: 2  Semaglutide-Weight Management (WEGOVY) 1 MG/0.5ML SOAJ SC injection, Inject 1 mg into the skin every 7 days, Disp: 2 mL, Rfl: 1  Semaglutide,0.25 or 0.5MG/DOS, (OZEMPIC, 0.25 OR 0.5 MG/DOSE,) 2 MG/1.5ML SOPN, Sample Lot [id number] Exp 11/30/24, Disp: 1 pen, Rfl: 0  metoclopramide (REGLAN) 10 MG tablet, Take by mouth, Disp: , Rfl:   fluticasone (FLONASE) 50 MCG/ACT nasal spray, 2 sprays by Nasal route daily, Disp: 16 g, Rfl: 5  clopidogrel (PLAVIX) 75 MG tablet, Take 1 tablet by mouth daily, Disp: 30 tablet, Rfl: 5  levothyroxine (SYNTHROID) 25 MCG tablet, Take 1 tablet by mouth Daily, Disp: 30 tablet, Rfl: 5  DULoxetine (CYMBALTA) 20 MG extended release capsule, Take 1 capsule by mouth daily, Disp: 30 capsule, Rfl: 5  carvedilol (COREG) 6.25 MG tablet, Take 1 tablet by mouth 2 times daily (with meals), Disp: 60 tablet, Rfl: 5  atorvastatin (LIPITOR) 80 MG tablet, atorvastatin 80 mg tablet, Disp: 30 tablet, Rfl: 5  albuterol sulfate HFA (VENTOLIN HFA) 108 (90 Base) MCG/ACT inhaler, Ventolin HFA 90 mcg/actuation aerosol inhaler 2 puffs prn, Disp: 1 each, Rfl: 0  Aspirin (ECOTRIN LOW STRENGTH PO), daily , Disp: , Rfl:   albuterol (PROVENTIL) (2.5 MG/3ML) 0.083% nebulizer solution, Inhale 2.5 mg into the lungs , Disp: , Rfl:   nitroGLYCERIN (NITROSTAT) 0.4 MG SL tablet, nitroglycerin 0.4 mg sublingual tablet, Disp: , Rfl:     Allerjies:     -- Gentamicin -- Anaphylaxis   -- Codeine -- Itching    --  Other reaction(s): Itching  Social History    Socioeconomic History      Marital status: Single    Tobacco Use      Smoking status: Current Every Day Smoker        Packs/day: 1.00        Years: 40.00        Pack years: 36        Types: Cigarettes        Start date: 12/4/1976      Smokeless tobacco: Never Used    Vaping Use      Vaping Use: Former    Substance and Sexual Activity      Alcohol use: Yes        Alcohol/week: 10.0 standard drinks        Types: 10 Cans of beer per week      Drug use: Not Currently        Types: Marijuana Sabina Mustard)      Sexual activity: Not Currently    Other Topics      Concerns:        Not on file    Social History Narrative      Not on file    Physical Exam    /80 (Site: Left Upper Arm, Position: Sitting, Cuff Size: Small Adult)   Temp 97.2 °F (36.2 °C)   Ht 5' (1.524 m)   Wt 155 lb (70.3 kg)   BMI 30.27 kg/m²       Review of Systems   Constitutional: Negative. HENT: Negative. Eyes: Negative. Respiratory: Negative. H/o COPD   Cardiovascular: Negative. Single stent 3 years ago   Gastrointestinal: Negative. Endocrine: Negative. Genitourinary: Negative. Musculoskeletal: Negative. Skin: Negative. Allergic/Immunologic: Negative. Neurological: Negative. Hematological: Negative. Psychiatric/Behavioral: Negative. All other systems reviewed and are negative. Objective   Physical Exam  Constitutional:       Appearance: Normal appearance. She is normal weight. HENT:      Head: Normocephalic and atraumatic. Nose: Nose normal.      Mouth/Throat:      Mouth: Mucous membranes are moist.   Eyes:      Extraocular Movements: Extraocular movements intact. Conjunctiva/sclera: Conjunctivae normal.      Pupils: Pupils are equal, round, and reactive to light. Cardiovascular:      Rate and Rhythm: Normal rate and regular rhythm. Pulses: Normal pulses. Heart sounds: Normal heart sounds. Pulmonary:      Effort: Pulmonary effort is normal.   Abdominal:      General: Abdomen is flat. Bowel sounds are normal.      Palpations: Abdomen is soft. Musculoskeletal:         General: Normal range of motion. Cervical back: Normal range of motion and neck supple. Skin:     General: Skin is warm. Neurological:      General: No focal deficit present. Mental Status: She is alert and oriented to person, place, and time. Mental status is at baseline. Cranial Nerves: No cranial nerve deficit. Sensory: No sensory deficit. Motor: No weakness. Psychiatric:         Mood and Affect: Mood normal.         Behavior: Behavior normal.         Thought Content: Thought content normal.         Judgment: Judgment normal.            On this date 7/14/2022 I have spent 15 minutes reviewing previous notes, test results and face to face with the patient discussing the diagnosis and importance of compliance with the treatment plan as well as documenting on the day of the visit. An electronic signature was used to authenticate this note.     --Lieutenant Wilfred MD

## 2022-07-15 ENCOUNTER — OFFICE VISIT (OUTPATIENT)
Dept: INTERNAL MEDICINE | Age: 57
End: 2022-07-15
Payer: COMMERCIAL

## 2022-07-15 VITALS
BODY MASS INDEX: 32 KG/M2 | WEIGHT: 163 LBS | HEART RATE: 53 BPM | HEIGHT: 60 IN | RESPIRATION RATE: 16 BRPM | DIASTOLIC BLOOD PRESSURE: 80 MMHG | SYSTOLIC BLOOD PRESSURE: 138 MMHG | OXYGEN SATURATION: 98 %

## 2022-07-15 DIAGNOSIS — F41.1 GAD (GENERALIZED ANXIETY DISORDER): ICD-10-CM

## 2022-07-15 DIAGNOSIS — K21.9 GASTROESOPHAGEAL REFLUX DISEASE WITHOUT ESOPHAGITIS: Primary | ICD-10-CM

## 2022-07-15 DIAGNOSIS — N18.30 STAGE 3 CHRONIC KIDNEY DISEASE, UNSPECIFIED WHETHER STAGE 3A OR 3B CKD (HCC): ICD-10-CM

## 2022-07-15 DIAGNOSIS — Z87.891 PERSONAL HISTORY OF TOBACCO USE: ICD-10-CM

## 2022-07-15 DIAGNOSIS — F10.99 ALCOHOL USE, UNSPECIFIED WITH UNSPECIFIED ALCOHOL-INDUCED DISORDER (HCC): ICD-10-CM

## 2022-07-15 DIAGNOSIS — J44.9 ASTHMA-CHRONIC OBSTRUCTIVE PULMONARY DISEASE OVERLAP SYNDROME (HCC): ICD-10-CM

## 2022-07-15 DIAGNOSIS — M46.1 INFLAMMATION OF SACROILIAC JOINT (HCC): ICD-10-CM

## 2022-07-15 DIAGNOSIS — I21.9 MYOCARDIAL INFARCTION, UNSPECIFIED MI TYPE, UNSPECIFIED ARTERY (HCC): ICD-10-CM

## 2022-07-15 PROBLEM — Z86.79 HX OF CORONARY ARTERY DISEASE: Status: ACTIVE | Noted: 2022-07-15

## 2022-07-15 PROBLEM — T40.601A OPIATE OVERDOSE (HCC): Status: ACTIVE | Noted: 2022-07-15

## 2022-07-15 PROBLEM — M54.9 DORSALGIA, UNSPECIFIED: Status: ACTIVE | Noted: 2018-10-08

## 2022-07-15 PROBLEM — F32.A DEPRESSIVE DISORDER: Status: ACTIVE | Noted: 2022-07-15

## 2022-07-15 PROBLEM — E11.9 TYPE 2 DIABETES MELLITUS (HCC): Status: RESOLVED | Noted: 2022-07-14 | Resolved: 2022-07-15

## 2022-07-15 PROBLEM — G89.4 CHRONIC PAIN SYNDROME: Status: ACTIVE | Noted: 2022-07-15

## 2022-07-15 PROCEDURE — 3023F SPIROM DOC REV: CPT | Performed by: PHYSICIAN ASSISTANT

## 2022-07-15 PROCEDURE — G8427 DOCREV CUR MEDS BY ELIG CLIN: HCPCS | Performed by: PHYSICIAN ASSISTANT

## 2022-07-15 PROCEDURE — 99214 OFFICE O/P EST MOD 30 MIN: CPT | Performed by: PHYSICIAN ASSISTANT

## 2022-07-15 PROCEDURE — G8417 CALC BMI ABV UP PARAM F/U: HCPCS | Performed by: PHYSICIAN ASSISTANT

## 2022-07-15 PROCEDURE — 3017F COLORECTAL CA SCREEN DOC REV: CPT | Performed by: PHYSICIAN ASSISTANT

## 2022-07-15 PROCEDURE — 4004F PT TOBACCO SCREEN RCVD TLK: CPT | Performed by: PHYSICIAN ASSISTANT

## 2022-07-15 RX ORDER — VARENICLINE TARTRATE 1 MG/1
1 TABLET, FILM COATED ORAL 2 TIMES DAILY
Qty: 180 TABLET | Refills: 1 | Status: SHIPPED | OUTPATIENT
Start: 2022-07-15

## 2022-07-15 RX ORDER — OMEPRAZOLE 20 MG/1
CAPSULE, DELAYED RELEASE ORAL
COMMUNITY
Start: 2022-06-04 | End: 2022-07-15 | Stop reason: SDUPTHER

## 2022-07-15 RX ORDER — OMEPRAZOLE 20 MG/1
20 CAPSULE, DELAYED RELEASE ORAL DAILY
Qty: 90 CAPSULE | Refills: 0 | Status: SHIPPED | OUTPATIENT
Start: 2022-07-15

## 2022-07-15 RX ORDER — FLUOXETINE HYDROCHLORIDE 20 MG/1
20 CAPSULE ORAL DAILY
Qty: 30 CAPSULE | Refills: 0 | Status: SHIPPED | OUTPATIENT
Start: 2022-07-15 | End: 2022-08-30 | Stop reason: SDUPTHER

## 2022-07-15 RX ORDER — ONDANSETRON 4 MG/1
4 TABLET, FILM COATED ORAL DAILY PRN
Qty: 30 TABLET | Refills: 0 | Status: SHIPPED | OUTPATIENT
Start: 2022-07-15 | End: 2022-08-29 | Stop reason: SDUPTHER

## 2022-07-15 RX ORDER — VARENICLINE TARTRATE 0.5 MG/1
.5-1 TABLET, FILM COATED ORAL SEE ADMIN INSTRUCTIONS
Qty: 57 TABLET | Refills: 0 | Status: SHIPPED | OUTPATIENT
Start: 2022-07-15

## 2022-07-15 SDOH — ECONOMIC STABILITY: FOOD INSECURITY: WITHIN THE PAST 12 MONTHS, YOU WORRIED THAT YOUR FOOD WOULD RUN OUT BEFORE YOU GOT MONEY TO BUY MORE.: NEVER TRUE

## 2022-07-15 SDOH — ECONOMIC STABILITY: FOOD INSECURITY: WITHIN THE PAST 12 MONTHS, THE FOOD YOU BOUGHT JUST DIDN'T LAST AND YOU DIDN'T HAVE MONEY TO GET MORE.: NEVER TRUE

## 2022-07-15 ASSESSMENT — SOCIAL DETERMINANTS OF HEALTH (SDOH): HOW HARD IS IT FOR YOU TO PAY FOR THE VERY BASICS LIKE FOOD, HOUSING, MEDICAL CARE, AND HEATING?: NOT HARD AT ALL

## 2022-07-15 NOTE — PROGRESS NOTES
Teddy Paul (: 1965) is a 64 y.o. female, Established patient, here for evaluation of the following chief complaint(s):  Nausea (Would like a refill on medication Antoinette), Gastroesophageal Reflux (Needs refill on omeprazole /), Health Maintenance (Handicap placard is expiring this month and needs new RX for it), and Anxiety (Would like to discuss anxiety medication, she is going thru some things and needs something short term, and would like to quit smoking as well)        ASSESSMENT/PLAN:  1. Gastroesophageal reflux disease without esophagitis  - stable on current meds, continue   - omeprazole (PRILOSEC) 20 MG delayed release capsule; Take 1 capsule by mouth in the morning. Dispense: 90 capsule; Refill: 0  - ondansetron (ZOFRAN) 4 MG tablet; Take 1 tablet by mouth daily as needed for Nausea or Vomiting  Dispense: 30 tablet; Refill: 0    2. Personal history of tobacco use  - wants to start CHantix, advised 6 months of continued use    - varenicline (CHANTIX) 0.5 MG tablet; Take 1-2 tablets by mouth See Admin Instructions 0.5mg DAILY for 3 days followed by 0.5mg TWICE DAILY for 4 days followed by 1mg TWICE DAILY  Dispense: 57 tablet; Refill: 0  - varenicline (CHANTIX) 1 MG tablet; Take 1 tablet by mouth in the morning and 1 tablet before bedtime. Dispense: 180 tablet; Refill: 1    3. JOSE CARLOS (generalized anxiety disorder)  - will start Prozac, and see her back in 3 week, sooner if needed   - FLUoxetine (PROZAC) 20 MG capsule; Take 1 capsule by mouth in the morning. Dispense: 30 capsule; Refill: 0    4. Alcohol use, unspecified with unspecified alcohol-induced disorder ([facility] [address]  - not using anymore, took of problem list     5. Asthma-chronic obstructive pulmonary disease overlap syndrome (HCC)  - stable, no issues     6. Myocardial infarction, unspecified MI type, unspecified artery ([facility] [address]  - h/o MI  - continue plavix  - advised smoking cessation     7.  Inflammation of sacroiliac joint ([facility] [address]  - seeing pain management   - needs to address the handicap placard with pain management      8. Stage 3 chronic kidney disease, unspecified whether stage 3a or 3b CKD (HCC)  - stable on GFR of 49. Will continue to monitor  - advised that she keep b/p and glucose controlled        Return in about 4 months (around 11/15/2022) for annual with fasting labs. SUBJECTIVE/OBJECTIVE:  HPI      GERD and nausea  On PPI and antiemetic  Has been out of both    Smoking  Wants to try Chantix    Anxiety  Not on medications for it  Going through a breakup and she is very stressed    Has been on Cymbalta for back, not working for anxiety     Alcohol use   Not using anymore       Back issues  She is seeing pain management   Advised her to get handicap placard for them       Review of Systems   Constitutional: Negative. HENT: Negative. Respiratory: Negative. Cardiovascular: Negative. Gastrointestinal: Negative. Genitourinary: Negative. Musculoskeletal: Negative. Neurological: Negative. Psychiatric/Behavioral:  Negative for dysphoric mood and sleep disturbance. The patient is nervous/anxious. Physical Exam  Vitals reviewed. Constitutional:       Appearance: Normal appearance. Cardiovascular:      Rate and Rhythm: Normal rate and regular rhythm. Heart sounds: Normal heart sounds. Pulmonary:      Effort: Pulmonary effort is normal.      Breath sounds: Normal breath sounds. Musculoskeletal:         General: Normal range of motion. Cervical back: Normal range of motion. Neurological:      General: No focal deficit present. Mental Status: She is alert and oriented to person, place, and time.    Psychiatric:         Mood and Affect: Mood normal.         Behavior: Behavior normal.       Vitals:    07/15/22 1100   BP: 138/80   Site: Right Upper Arm   Position: Sitting   Cuff Size: Medium Adult   Pulse: 53   Resp: 16   SpO2: 98%   Weight: 163 lb (73.9 kg)   Height: 5' (1.524 m)                 An electronic signature was used to authenticate this note.     --KWAME Butcher

## 2022-07-17 ASSESSMENT — ENCOUNTER SYMPTOMS
GASTROINTESTINAL NEGATIVE: 1
RESPIRATORY NEGATIVE: 1

## 2022-07-25 ENCOUNTER — TELEPHONE (OUTPATIENT)
Dept: PAIN MANAGEMENT | Age: 57
End: 2022-07-25

## 2022-07-25 DIAGNOSIS — M96.1 LUMBAR POST-LAMINECTOMY SYNDROME: Primary | ICD-10-CM

## 2022-07-25 NOTE — TELEPHONE ENCOUNTER
Patient states that Dr Abimbola Batista (psychiatrist) requires a referral from Dr Keyonna Johansen. Patient needs to this him in order to get her spinal stimulator.  Will Dr Keyonna Johansen please provide referral? Benjamin Harmon fax #[phone]

## 2022-07-26 ENCOUNTER — TELEPHONE (OUTPATIENT)
Dept: INTERNAL MEDICINE | Age: 57
End: 2022-07-26

## 2022-07-26 NOTE — TELEPHONE ENCOUNTER
Patient states she thought you were ordering a timed A1C lab and she also wanted a urine ordered for her UTI    Thank you

## 2022-07-26 NOTE — TELEPHONE ENCOUNTER
CALLED THE PATIENT TO LET HER KNOW WE, DO HAVE THE REFERRAL FOR THE PHYSPYCHATRIST , WE WILL GET THE AUTH AND THEN HAVE THE PATIENT FOLLOW UP FOR THE STIMULATOR. PATIENT DOES HAVE A FOLLOW UP WITH DR Meena Shirley ON 8/11/22 AND HE WOULD LIKE FOR THE PATIENT TO KEEP THIS APPT.

## 2022-07-26 NOTE — TELEPHONE ENCOUNTER
I just put in an order. I do not have the providers address, see if we can send it or have ptt collect the report.

## 2022-08-04 NOTE — TELEPHONE ENCOUNTER
I faxed the order/referral to Dr. Abimbola Batista at [phone] with copy of OV note for psych clearance for consideration of spinal cord stim. Once clearance received and Dr. Keyonna Johansen places order for stim trial we will send order to [address] for auth submission.

## 2022-08-11 ENCOUNTER — OFFICE VISIT (OUTPATIENT)
Dept: PAIN MANAGEMENT | Age: 57
End: 2022-08-11
Payer: MEDICARE

## 2022-08-11 VITALS
HEART RATE: 104 BPM | TEMPERATURE: 97.6 F | WEIGHT: 158 LBS | OXYGEN SATURATION: 97 % | DIASTOLIC BLOOD PRESSURE: 68 MMHG | BODY MASS INDEX: 31.02 KG/M2 | HEIGHT: 60 IN | SYSTOLIC BLOOD PRESSURE: 132 MMHG

## 2022-08-11 DIAGNOSIS — M51.36 DEGENERATION OF LUMBAR INTERVERTEBRAL DISC: ICD-10-CM

## 2022-08-11 DIAGNOSIS — M96.1 LUMBAR POST-LAMINECTOMY SYNDROME: ICD-10-CM

## 2022-08-11 DIAGNOSIS — M47.817 LUMBOSACRAL SPONDYLOSIS WITHOUT MYELOPATHY: ICD-10-CM

## 2022-08-11 PROBLEM — E11.9 TYPE 2 DIABETES MELLITUS (HCC): Status: ACTIVE | Noted: 2022-08-11

## 2022-08-11 PROCEDURE — 3017F COLORECTAL CA SCREEN DOC REV: CPT | Performed by: PAIN MEDICINE

## 2022-08-11 PROCEDURE — G8427 DOCREV CUR MEDS BY ELIG CLIN: HCPCS | Performed by: PAIN MEDICINE

## 2022-08-11 PROCEDURE — G8417 CALC BMI ABV UP PARAM F/U: HCPCS | Performed by: PAIN MEDICINE

## 2022-08-11 PROCEDURE — 4004F PT TOBACCO SCREEN RCVD TLK: CPT | Performed by: PAIN MEDICINE

## 2022-08-11 PROCEDURE — 99213 OFFICE O/P EST LOW 20 MIN: CPT | Performed by: PAIN MEDICINE

## 2022-08-11 RX ORDER — CARISOPRODOL 350 MG/1
350 TABLET ORAL 3 TIMES DAILY PRN
Qty: 84 TABLET | Refills: 2 | Status: SHIPPED | OUTPATIENT
Start: 2022-08-11 | End: 2022-09-08

## 2022-08-11 ASSESSMENT — ENCOUNTER SYMPTOMS
ALLERGIC/IMMUNOLOGIC NEGATIVE: 1
RESPIRATORY NEGATIVE: 1
GASTROINTESTINAL NEGATIVE: 1
EYES NEGATIVE: 1

## 2022-08-11 NOTE — PROGRESS NOTES
Erik Krueger (:  1965) is a 64 y.o. female,Established patient, here for evaluation of the following chief complaint(s):  Back Pain         ASSESSMENT/PLAN:  1. Lumbosacral spondylosis without myelopathy  The following orders have not been finalized:  -     carisoprodol (SOMA) 350 MG tablet  2. Degeneration of lumbar intervertebral disc  The following orders have not been finalized:  -     carisoprodol (SOMA) 350 MG tablet  3. Lumbar post-laminectomy syndrome  The following orders have not been finalized:  -     carisoprodol (SOMA) 350 MG tablet       Patient presents with complaint of back pain. Mostly in the back with occational Radiation, Had Epidurals, Laminotomies with no Relief, MRI shows Pedicle screws inserted at the L3, L4 and L5 levels bilaterally. Intervertebral spacers have been inserted at L3-4 and L4-5 levels and cause artifacts. No abnormal sites of enhancement. Last PT did not help and made it worse. Was on narcs and had a suicidal attempt with them in the past, On Plavix since 3 years for a cardiac stent,  Plan was for a stim trial pt just had a psych eval  Pt was sent to Dr Mariam Lara, Reports not satishfied with Care apparently because she discussed Smoking Cessation and referred her for this, How ever the conversation went into the topic of her suicidal attempt, and her statement that she likes to be closer to god, and She does not want me to contact her Psychiatrist at this time, She Found another psychiatrist and has an appointment with him. Will continue to treate this pain with Nonnarcotics for now. Will Get her a [employer] card. Subjective   SUBJECTIVE/OBJECTIVE:  Patient presents with complaint of back pain. Mostly in the back with occational Radiation, Had Epidurals, Laminotomies with no Relief, MRI shows Pedicle screws inserted at the L3, L4 and L5 levels bilaterally. Intervertebral spacers have been inserted at L3-4 and L4-5 levels and cause artifacts.  No abnormal sites of enhancement. Last PT did not help and made it worse. Was on narcs and had a suicidal attempt with them in the past, On Plavix since 3 years for a cardiac stent,  Plan was for a stim trial pt just had a psych eval  Pt was sent to Dr Mariam Lara, Reports not satishfied with Care apparently because she discussed Smoking Cessation and referred her for this, How ever the conversation went into the topic of her suicidal attempt, and her statement that she likes to be closer to god, and She does not want me to contact her Psychiatrist at this time, She Found another psychiatrist and has an appointment with him. Review of Systems   Constitutional: Negative. HENT: Negative. Eyes: Negative. Respiratory: Negative. H/o COPD   Cardiovascular: Negative. Single stent 3 years ago   Gastrointestinal: Negative. Endocrine: Negative. Genitourinary: Negative. Musculoskeletal: Negative. Skin: Negative. Allergic/Immunologic: Negative. Neurological: Negative. Hematological: Negative. Psychiatric/Behavioral: Negative. All other systems reviewed and are negative. Objective   Physical Exam  Constitutional:       Appearance: Normal appearance. She is normal weight. HENT:      Head: Normocephalic and atraumatic. Nose: Nose normal.      Mouth/Throat:      Mouth: Mucous membranes are moist.   Eyes:      Extraocular Movements: Extraocular movements intact. Conjunctiva/sclera: Conjunctivae normal.      Pupils: Pupils are equal, round, and reactive to light. Cardiovascular:      Rate and Rhythm: Normal rate and regular rhythm. Pulses: Normal pulses. Heart sounds: Normal heart sounds. Pulmonary:      Effort: Pulmonary effort is normal.   Abdominal:      General: Abdomen is flat. Bowel sounds are normal.      Palpations: Abdomen is soft. Musculoskeletal:         General: Normal range of motion.       Cervical back: Normal range of motion and

## 2022-08-24 DIAGNOSIS — F41.1 GAD (GENERALIZED ANXIETY DISORDER): ICD-10-CM

## 2022-08-24 NOTE — TELEPHONE ENCOUNTER
Comments:     Last Office Visit (last PCP visit):   7/15/2022    Next Visit Date:  Future Appointments   Date Time Provider Teresa Valente   9/7/2022  3:45 PM [facility] [place]   11/7/2022 11:00 AM KWAME Deng [facility]       **If hasn't been seen in over a year OR hasn't followed up according to last diabetes/ADHD visit, make appointment for patient before sending refill to provider.     Rx requested:  Requested Prescriptions     Pending Prescriptions Disp Refills    FLUoxetine (PROZAC) 20 MG capsule [Pharmacy Med Name: FLUoxetine HCl Oral Capsule 20 MG] 30 capsule 0     Sig: TAKE ONE CAPSULE BY MOUTH EVERY MORNING

## 2022-08-26 DIAGNOSIS — K21.9 GASTROESOPHAGEAL REFLUX DISEASE WITHOUT ESOPHAGITIS: ICD-10-CM

## 2022-08-26 NOTE — TELEPHONE ENCOUNTER
Comments:     Last Office Visit (last PCP visit):   7/15/2022    Next Visit Date:  Future Appointments   Date Time Provider Teresa Valente   9/7/2022  3:45 PM Yola Chow MD [facility] AT [place]   11/7/2022 11:00 AM KWAME Erazo [address]       **If hasn't been seen in over a year OR hasn't followed up according to last diabetes/ADHD visit, make appointment for patient before sending refill to provider.     Rx requested:  Requested Prescriptions      No prescriptions requested or ordered in this encounter

## 2022-08-26 NOTE — TELEPHONE ENCOUNTER
----- Message from Kaylen Whitehead sent at 8/25/2022 10:19 AM EDT -----  Subject: Refill Request    QUESTIONS  Name of Medication? ondansetron (ZOFRAN) 4 MG tablet  Patient-reported dosage and instructions? 4 mg daily prn  How many days do you have left? 0  Preferred Pharmacy? [address] [facility] phone number (if available)? [phone]  Additional Information for Provider? please call when sent.   ---------------------------------------------------------------------------  --------------  CALL BACK INFO  What is the best way for the office to contact you? OK to leave message on   voicemail  Preferred Call Back Phone Number? [phone]  ---------------------------------------------------------------------------  --------------  SCRIPT ANSWERS  Relationship to Patient?  Self

## 2022-08-27 DIAGNOSIS — F41.1 GAD (GENERALIZED ANXIETY DISORDER): ICD-10-CM

## 2022-08-27 NOTE — TELEPHONE ENCOUNTER
Comments:     Last Office Visit (last PCP visit):   7/15/2022    Next Visit Date:  Future Appointments   Date Time Provider Teresa Valente   9/7/2022  3:45 PM MD NOLVIA Nunes [facility] AT [place]   11/7/2022 11:00 AM KWAME Espinoza [facility]       **If hasn't been seen in over a year OR hasn't followed up according to last diabetes/ADHD visit, make appointment for patient before sending refill to provider.     Rx requested:  Requested Prescriptions     Pending Prescriptions Disp Refills    FLUoxetine (PROZAC) 20 MG capsule 30 capsule 0     Sig: Take 1 capsule by mouth daily

## 2022-08-27 NOTE — TELEPHONE ENCOUNTER
----- Message from Nettie Bynum sent at 8/24/2022  1:40 PM EDT -----  Subject: Refill Request    QUESTIONS  Name of Medication? FLUoxetine (PROZAC) 20 MG capsule  Patient-reported dosage and instructions? once a day  How many days do you have left? 0  Preferred Pharmacy? [address] phone number (if available)? [phone]  ---------------------------------------------------------------------------  --------------,  Name of Medication? Other - Zoloft, pt can't remember the mg  Patient-reported dosage and instructions? 3-4 times a day  How many days do you have left? 0  Preferred Pharmacy? [address] phone number (if available)? [phone]  ---------------------------------------------------------------------------  --------------  Soren TSAI  What is the best way for the office to contact you? OK to leave message on   voicemail  Preferred Call Back Phone Number? [phone]  ---------------------------------------------------------------------------  --------------  SCRIPT ANSWERS  Relationship to Patient?  Self

## 2022-08-29 RX ORDER — ONDANSETRON 4 MG/1
4 TABLET, FILM COATED ORAL DAILY PRN
Qty: 30 TABLET | Refills: 0 | Status: SHIPPED | OUTPATIENT
Start: 2022-08-29

## 2022-08-30 RX ORDER — FLUOXETINE HYDROCHLORIDE 20 MG/1
CAPSULE ORAL
Qty: 30 CAPSULE | Refills: 0 | OUTPATIENT
Start: 2022-08-30

## 2022-08-30 RX ORDER — FLUOXETINE HYDROCHLORIDE 20 MG/1
20 CAPSULE ORAL DAILY
Qty: 90 CAPSULE | Refills: 1 | Status: SHIPPED | OUTPATIENT
Start: 2022-08-30

## 2022-10-12 ENCOUNTER — TELEPHONE (OUTPATIENT)
Dept: NEUROSURGERY | Age: 57
End: 2022-10-12

## 2022-10-12 NOTE — TELEPHONE ENCOUNTER
PT CALLED @ 4:40PM ASKING IF WE HAD RECEIVED HER PSYCH EVAL SO THAT WE COULD START THE PROCESS OF SCHEDULING HER FOR A SPINAL CORD STIM TRIAL. LET HER KNOW NOTHING HAS BEEN RECD AND ONCE WE GET THAT, I WILL ASK DR. TYLER IF HE NEEDS TO SEE HER AGAIN, OR IF WE CAN JUST START THE AUTH PROCESS. PT IS GOING TO CALL HER PSYCHIATRIST AGAIN, AND SHE WAS GIVEN THE FAX NUMBER FOR THEM TO SEND HER RECORDS TO US.

## 2022-11-08 ENCOUNTER — TELEPHONE (OUTPATIENT)
Dept: PAIN MANAGEMENT | Age: 57
End: 2022-11-08

## 2022-11-08 ENCOUNTER — OFFICE VISIT (OUTPATIENT)
Dept: PAIN MANAGEMENT | Age: 57
End: 2022-11-08
Payer: MEDICARE

## 2022-11-08 VITALS
TEMPERATURE: 97.1 F | DIASTOLIC BLOOD PRESSURE: 82 MMHG | OXYGEN SATURATION: 97 % | WEIGHT: 160 LBS | HEIGHT: 60 IN | BODY MASS INDEX: 31.41 KG/M2 | SYSTOLIC BLOOD PRESSURE: 138 MMHG | HEART RATE: 72 BPM

## 2022-11-08 DIAGNOSIS — M96.1 LUMBAR POST-LAMINECTOMY SYNDROME: ICD-10-CM

## 2022-11-08 DIAGNOSIS — M47.817 LUMBOSACRAL SPONDYLOSIS WITHOUT MYELOPATHY: Primary | ICD-10-CM

## 2022-11-08 DIAGNOSIS — M51.36 DEGENERATION OF LUMBAR INTERVERTEBRAL DISC: ICD-10-CM

## 2022-11-08 PROBLEM — E11.9 TYPE 2 DIABETES MELLITUS (HCC): Status: ACTIVE | Noted: 2022-11-08

## 2022-11-08 PROCEDURE — 3074F SYST BP LT 130 MM HG: CPT | Performed by: PAIN MEDICINE

## 2022-11-08 PROCEDURE — 4004F PT TOBACCO SCREEN RCVD TLK: CPT | Performed by: PAIN MEDICINE

## 2022-11-08 PROCEDURE — 3078F DIAST BP <80 MM HG: CPT | Performed by: PAIN MEDICINE

## 2022-11-08 PROCEDURE — 99213 OFFICE O/P EST LOW 20 MIN: CPT | Performed by: PAIN MEDICINE

## 2022-11-08 PROCEDURE — G8484 FLU IMMUNIZE NO ADMIN: HCPCS | Performed by: PAIN MEDICINE

## 2022-11-08 PROCEDURE — G8427 DOCREV CUR MEDS BY ELIG CLIN: HCPCS | Performed by: PAIN MEDICINE

## 2022-11-08 PROCEDURE — G8417 CALC BMI ABV UP PARAM F/U: HCPCS | Performed by: PAIN MEDICINE

## 2022-11-08 PROCEDURE — 3017F COLORECTAL CA SCREEN DOC REV: CPT | Performed by: PAIN MEDICINE

## 2022-11-08 RX ORDER — CARISOPRODOL 350 MG/1
350 TABLET ORAL 3 TIMES DAILY PRN
Qty: 84 TABLET | Refills: 3 | Status: SHIPPED | OUTPATIENT
Start: 2022-11-08 | End: 2022-12-06

## 2022-11-08 ASSESSMENT — ENCOUNTER SYMPTOMS
EYES NEGATIVE: 1
RESPIRATORY NEGATIVE: 1
GASTROINTESTINAL NEGATIVE: 1
ALLERGIC/IMMUNOLOGIC NEGATIVE: 1

## 2022-11-08 NOTE — PROGRESS NOTES
Arnel Andersen (:  1965) is a 62 y.o. female,Established patient, here for evaluation of the following chief complaint(s):  Back Pain, Neck Pain, and Knee Pain (Left worse than right )         ASSESSMENT/PLAN:  1. Lumbosacral spondylosis without myelopathy  -     carisoprodol (SOMA) 350 MG tablet; Take 1 tablet by mouth 3 times daily as needed for Muscle spasms for up to 28 days. , Disp-84 tablet, R-3Normal  2. Degeneration of lumbar intervertebral disc  -     carisoprodol (SOMA) 350 MG tablet; Take 1 tablet by mouth 3 times daily as needed for Muscle spasms for up to 28 days. , Disp-84 tablet, R-3Normal  3. Lumbar post-laminectomy syndrome  -     carisoprodol (SOMA) 350 MG tablet; Take 1 tablet by mouth 3 times daily as needed for Muscle spasms for up to 28 days. , Disp-84 tablet, R-3Normal       Patient presents with complaint of back pain. Mostly in the back with occational Radiation, Had Epidurals, Laminotomies with no Relief, MRI shows Pedicle screws inserted at the L3, L4 and L5 levels bilaterally. Intervertebral spacers have been inserted at L3-4 and L4-5 levels and cause artifacts. No abnormal sites of enhancement. Last PT did not help and made it worse. Was on narcs and had a suicidal attempt with them in the past, On Plavix since 3 years for a cardiac stent,  Plan was for a stim trial pt just had a psych eval  Pt was sent to Dr Bishop Soto, Reports not satishfied with Care apparently because she discussed Smoking Cessation and referred her for this, How ever the conversation went into the topic of her suicidal attempt, and her statement that she likes to be closer to god, and She does not want me to contact her Psychiatrist at this time, She Found another psychiatrist Dr Debra Bruno at [facility] we are still waiting for his report. Will continue to treate this pain with Nonnarcotics for now. Will Get her a [employer] card.     Subjective   SUBJECTIVE/OBJECTIVE:  Patient presents with complaint of back pain. Mostly in the back with occational Radiation, Had Epidurals, Laminotomies with no Relief, MRI shows Pedicle screws inserted at the L3, L4 and L5 levels bilaterally. Intervertebral spacers have been inserted at L3-4 and L4-5 levels and cause artifacts. No abnormal sites of enhancement. Last PT did not help and made it worse. Was on narcs and had a suicidal attempt with them in the past, On Plavix since 3 years for a cardiac stent,  Plan was for a stim trial pt just had a psych eval  Pt was sent to Dr Chaya Smith, Reports not satishfied with Care apparently because she discussed Smoking Cessation and referred her for this, How ever the conversation went into the topic of her suicidal attempt, and her statement that she likes to be closer to god, and She does not want me to contact her Psychiatrist at this time, She Found another psychiatrist Dr Rock Noel at [facility] we are still waiting for his report. Will continue to treate this pain with Nonnarcotics for now. Will Get her a [employer] card. Review of Systems   Constitutional: Negative. HENT: Negative. Eyes: Negative. Respiratory: Negative. H/o COPD   Cardiovascular: Negative. Single stent 3 years ago   Gastrointestinal: Negative. Endocrine: Negative. Genitourinary: Negative. Musculoskeletal: Negative. Skin: Negative. Allergic/Immunologic: Negative. Neurological: Negative. Hematological: Negative. Psychiatric/Behavioral: Negative. All other systems reviewed and are negative. Objective   Physical Exam  Constitutional:       Appearance: Normal appearance. She is normal weight. HENT:      Head: Normocephalic and atraumatic. Nose: Nose normal.      Mouth/Throat:      Mouth: Mucous membranes are moist.   Eyes:      Extraocular Movements: Extraocular movements intact. Conjunctiva/sclera: Conjunctivae normal.      Pupils: Pupils are equal, round, and reactive to light.    Cardiovascular:

## 2022-11-21 ENCOUNTER — TELEPHONE (OUTPATIENT)
Dept: INTERNAL MEDICINE | Age: 57
End: 2022-11-21

## 2022-11-21 NOTE — TELEPHONE ENCOUNTER
Pt asks if Dr. Virginia Carney can order spinal cord stimulator or must she be seen in office first?

## 2022-11-21 NOTE — TELEPHONE ENCOUNTER
Maria Luisa Burnette  Clinical Staff  Subject: Refill Request     QUESTIONS   Name of Medication? Other - zoloft   Patient-reported dosage and instructions? 4 MG 1 time daily   How many days do you have left? 0   Preferred [facility]acy? [id number]   Pharmacy phone number (if available)? [phone]   Additional Information for Provider? for nausea and vomiting   ---------------------------------------------------------------------------   --------------   CALL BACK INFO   What is the best way for the office to contact you? OK to leave message on   voicemail   Preferred Call Back Phone Number? [phone]   ---------------------------------------------------------------------------   --------------   SCRIPT ANSWERS   Relationship to Patient?  Self Mother is going to call and schedule appointment with Dr. Posey to go over the [place] assessment.  Annie   Acitretin Counseling:  I discussed with the patient the risks of acitretin including but not limited to hair loss, dry lips/skin/eyes, liver damage, hyperlipidemia, depression/suicidal ideation, photosensitivity.  Serious rare side effects can include but are not limited to pancreatitis, pseudotumor cerebri, bony changes, clot formation/stroke/heart attack.  Patient understands that alcohol is contraindicated since it can result in liver toxicity and significantly prolong the elimination of the drug by many years.

## 2022-11-23 DIAGNOSIS — K21.9 GASTROESOPHAGEAL REFLUX DISEASE WITHOUT ESOPHAGITIS: ICD-10-CM

## 2022-11-23 RX ORDER — ONDANSETRON 4 MG/1
4 TABLET, FILM COATED ORAL DAILY PRN
Qty: 30 TABLET | Refills: 0 | Status: SHIPPED | OUTPATIENT
Start: 2022-11-23

## 2022-11-24 DIAGNOSIS — E03.9 HYPOTHYROIDISM, UNSPECIFIED TYPE: ICD-10-CM

## 2022-11-24 RX ORDER — LEVOTHYROXINE SODIUM 0.03 MG/1
25 TABLET ORAL DAILY
Qty: 30 TABLET | Refills: 5 | OUTPATIENT
Start: 2022-11-24

## 2022-11-30 DIAGNOSIS — E03.9 HYPOTHYROIDISM, UNSPECIFIED TYPE: ICD-10-CM

## 2022-11-30 RX ORDER — LEVOTHYROXINE SODIUM 0.03 MG/1
25 TABLET ORAL DAILY
Qty: 30 TABLET | Refills: 5 | OUTPATIENT
Start: 2022-11-30

## 2022-12-06 DIAGNOSIS — E03.9 HYPOTHYROIDISM, UNSPECIFIED TYPE: ICD-10-CM

## 2022-12-06 RX ORDER — LEVOTHYROXINE SODIUM 0.03 MG/1
25 TABLET ORAL DAILY
Qty: 30 TABLET | Refills: 0 | Status: SHIPPED | OUTPATIENT
Start: 2022-12-06

## 2022-12-19 ENCOUNTER — OFFICE VISIT (OUTPATIENT)
Dept: PAIN MANAGEMENT | Age: 57
End: 2022-12-19
Payer: MEDICARE

## 2022-12-19 VITALS
DIASTOLIC BLOOD PRESSURE: 76 MMHG | WEIGHT: 160 LBS | TEMPERATURE: 97.4 F | HEIGHT: 60 IN | BODY MASS INDEX: 31.41 KG/M2 | SYSTOLIC BLOOD PRESSURE: 132 MMHG

## 2022-12-19 DIAGNOSIS — M96.1 LUMBAR POST-LAMINECTOMY SYNDROME: Primary | ICD-10-CM

## 2022-12-19 DIAGNOSIS — M47.817 LUMBOSACRAL SPONDYLOSIS WITHOUT MYELOPATHY: ICD-10-CM

## 2022-12-19 DIAGNOSIS — M51.36 DEGENERATION OF LUMBAR INTERVERTEBRAL DISC: ICD-10-CM

## 2022-12-19 PROBLEM — E11.9 TYPE 2 DIABETES MELLITUS (HCC): Status: ACTIVE | Noted: 2022-12-19

## 2022-12-19 PROCEDURE — G8427 DOCREV CUR MEDS BY ELIG CLIN: HCPCS | Performed by: PAIN MEDICINE

## 2022-12-19 PROCEDURE — G8484 FLU IMMUNIZE NO ADMIN: HCPCS | Performed by: PAIN MEDICINE

## 2022-12-19 PROCEDURE — G8417 CALC BMI ABV UP PARAM F/U: HCPCS | Performed by: PAIN MEDICINE

## 2022-12-19 PROCEDURE — 4004F PT TOBACCO SCREEN RCVD TLK: CPT | Performed by: PAIN MEDICINE

## 2022-12-19 PROCEDURE — 3017F COLORECTAL CA SCREEN DOC REV: CPT | Performed by: PAIN MEDICINE

## 2022-12-19 PROCEDURE — 3078F DIAST BP <80 MM HG: CPT | Performed by: PAIN MEDICINE

## 2022-12-19 PROCEDURE — 3074F SYST BP LT 130 MM HG: CPT | Performed by: PAIN MEDICINE

## 2022-12-19 PROCEDURE — 99213 OFFICE O/P EST LOW 20 MIN: CPT | Performed by: PAIN MEDICINE

## 2022-12-19 ASSESSMENT — ENCOUNTER SYMPTOMS
RESPIRATORY NEGATIVE: 1
GASTROINTESTINAL NEGATIVE: 1
EYES NEGATIVE: 1
ALLERGIC/IMMUNOLOGIC NEGATIVE: 1

## 2022-12-19 NOTE — PROGRESS NOTES
Sara Hilario (:  1965) is a 62 y.o. female,Established patient, here for evaluation of the following chief complaint(s):  Back Pain         ASSESSMENT/PLAN:  1. Lumbar post-laminectomy syndrome  -     IA PERCUT IMPLNT NEUROELECT,EPIDURAL; Future  2. Degeneration of lumbar intervertebral disc  3. Lumbosacral spondylosis without myelopathy       Patient presents with complaint of back pain. Mostly in the back with occational Radiation, Had Epidurals, Laminotomies with no Relief, MRI shows Pedicle screws inserted at the L3, L4 and L5 levels bilaterally. Intervertebral spacers have been inserted at L3-4 and L4-5 levels and cause artifacts. No abnormal sites of enhancement. Last PT did not help and made it worse. Was on narcs and had a suicidal attempt with them in the past, On Plavix since 3 years for a cardiac stent,  Plan was for a stim trial pt just had a psych eval   She Found another psychiatrist Dr Alireza Saunders at [facility] Has a favourable psych eval . Plan on stim trial.    Subjective   SUBJECTIVE/OBJECTIVE:  Patient presents with complaint of back pain. Mostly in the back with occational Radiation, Had Epidurals, Laminotomies with no Relief, MRI shows Pedicle screws inserted at the L3, L4 and L5 levels bilaterally. Intervertebral spacers have been inserted at L3-4 and L4-5 levels and cause artifacts. No abnormal sites of enhancement. Last PT did not help and made it worse. Was on narcs and had a suicidal attempt with them in the past, On Plavix since 3 years for a cardiac stent,  Plan was for a stim trial pt just had a psych eval   She Found another psychiatrist Dr Alireza Saunders at [facility] Has a favourable psych eval .     Review of Systems   Constitutional: Negative. HENT: Negative. Eyes: Negative. Respiratory: Negative. H/o COPD   Cardiovascular: Negative. Single stent 3 years ago   Gastrointestinal: Negative. Endocrine: Negative. Genitourinary: Negative.     Musculoskeletal: Negative. Skin: Negative. Allergic/Immunologic: Negative. Neurological: Negative. Hematological: Negative. Psychiatric/Behavioral: Negative. All other systems reviewed and are negative. Objective   Physical Exam  Constitutional:       Appearance: Normal appearance. She is normal weight. HENT:      Head: Normocephalic and atraumatic. Nose: Nose normal.      Mouth/Throat:      Mouth: Mucous membranes are moist.   Eyes:      Extraocular Movements: Extraocular movements intact. Conjunctiva/sclera: Conjunctivae normal.      Pupils: Pupils are equal, round, and reactive to light. Cardiovascular:      Rate and Rhythm: Normal rate and regular rhythm. Pulses: Normal pulses. Heart sounds: Normal heart sounds. Pulmonary:      Effort: Pulmonary effort is normal.   Abdominal:      General: Abdomen is flat. Bowel sounds are normal.      Palpations: Abdomen is soft. Musculoskeletal:         General: Normal range of motion. Cervical back: Normal range of motion and neck supple. Skin:     General: Skin is warm. Neurological:      General: No focal deficit present. Mental Status: She is alert and oriented to person, place, and time. Mental status is at baseline. Cranial Nerves: No cranial nerve deficit. Sensory: No sensory deficit. Motor: No weakness. Psychiatric:         Mood and Affect: Mood normal.         Behavior: Behavior normal.         Thought Content:  Thought content normal.         Judgment: Judgment normal.        --Ami Cortes MD

## 2023-01-03 DIAGNOSIS — E03.9 HYPOTHYROIDISM, UNSPECIFIED TYPE: ICD-10-CM

## 2023-01-03 RX ORDER — LEVOTHYROXINE SODIUM 0.03 MG/1
25 TABLET ORAL DAILY
Qty: 30 TABLET | Refills: 0 | OUTPATIENT
Start: 2023-01-03

## 2023-01-10 DIAGNOSIS — E03.9 HYPOTHYROIDISM, UNSPECIFIED TYPE: ICD-10-CM

## 2023-01-10 RX ORDER — LEVOTHYROXINE SODIUM 0.03 MG/1
25 TABLET ORAL DAILY
Qty: 30 TABLET | Refills: 0 | OUTPATIENT
Start: 2023-01-10

## 2023-01-31 ENCOUNTER — PREP FOR PROCEDURE (OUTPATIENT)
Dept: NEUROSURGERY | Age: 58
End: 2023-01-31

## 2023-02-09 ENCOUNTER — HOSPITAL ENCOUNTER (OUTPATIENT)
Dept: PREADMISSION TESTING | Age: 58
Discharge: HOME | End: 2023-02-13
Payer: MEDICARE

## 2023-02-09 VITALS
RESPIRATION RATE: 16 BRPM | BODY MASS INDEX: 31.84 KG/M2 | SYSTOLIC BLOOD PRESSURE: 119 MMHG | OXYGEN SATURATION: 98 % | HEART RATE: 81 BPM | TEMPERATURE: 97.6 F | HEIGHT: 60 IN | DIASTOLIC BLOOD PRESSURE: 54 MMHG | WEIGHT: 162.2 LBS

## 2023-02-09 PROBLEM — D64.9 ANEMIA: Status: ACTIVE | Noted: 2023-02-09

## 2023-02-09 LAB
ABO/RH: NORMAL
ALBUMIN SERPL-MCNC: 4.1 G/DL (ref 3.5–4.6)
ALP BLD-CCNC: 38 U/L (ref 40–130)
ALT SERPL-CCNC: 11 U/L (ref 0–33)
ANION GAP SERPL CALCULATED.3IONS-SCNC: 12 MEQ/L (ref 9–15)
ANTIBODY SCREEN: NORMAL
APTT: 23.7 SEC (ref 24.4–36.8)
AST SERPL-CCNC: 14 U/L (ref 0–35)
BACTERIA: ABNORMAL /HPF
BILIRUB SERPL-MCNC: <0.2 MG/DL (ref 0.2–0.7)
BILIRUBIN URINE: NEGATIVE
BLOOD, URINE: ABNORMAL
BUN BLDV-MCNC: 22 MG/DL (ref 6–20)
CALCIUM SERPL-MCNC: 10.1 MG/DL (ref 8.5–9.9)
CHLORIDE BLD-SCNC: 101 MEQ/L (ref 95–107)
CLARITY: CLEAR
CO2: 23 MEQ/L (ref 20–31)
COLOR: YELLOW
CREAT SERPL-MCNC: 0.96 MG/DL (ref 0.5–0.9)
EKG ATRIAL RATE: 74 BPM
EKG P AXIS: 51 DEGREES
EKG P-R INTERVAL: 132 MS
EKG Q-T INTERVAL: 416 MS
EKG QRS DURATION: 100 MS
EKG QTC CALCULATION (BAZETT): 461 MS
EKG R AXIS: 47 DEGREES
EKG T AXIS: 19 DEGREES
EKG VENTRICULAR RATE: 74 BPM
EPITHELIAL CELLS, UA: ABNORMAL /HPF (ref 0–5)
GFR SERPL CREATININE-BSD FRML MDRD: >60 ML/MIN/{1.73_M2}
GLOBULIN: 2.6 G/DL (ref 2.3–3.5)
GLUCOSE BLD-MCNC: 87 MG/DL (ref 70–99)
GLUCOSE URINE: NEGATIVE MG/DL
HBA1C MFR BLD: 5.6 % (ref 4.8–5.9)
HCT VFR BLD CALC: 38.6 % (ref 37–47)
HEMOGLOBIN: 12.8 G/DL (ref 12–16)
HYALINE CASTS: ABNORMAL /HPF (ref 0–5)
INR BLD: 0.9
KETONES, URINE: NEGATIVE MG/DL
LEUKOCYTE ESTERASE, URINE: ABNORMAL
MCH RBC QN AUTO: 31.5 PG (ref 27–31.3)
MCHC RBC AUTO-ENTMCNC: 33.2 % (ref 33–37)
MCV RBC AUTO: 94.9 FL (ref 79.4–94.8)
NITRITE, URINE: NEGATIVE
PDW BLD-RTO: 13.6 % (ref 11.5–14.5)
PH UA: 5.5 (ref 5–9)
PLATELET # BLD: 199 K/UL (ref 130–400)
PLATELET SLIDE REVIEW: NORMAL
POTASSIUM SERPL-SCNC: 4.9 MEQ/L (ref 3.4–4.9)
PROTEIN UA: ABNORMAL MG/DL
PROTHROMBIN TIME: 12.4 SEC (ref 12.3–14.9)
RBC # BLD: 4.07 M/UL (ref 4.2–5.4)
RBC UA: ABNORMAL /HPF (ref 0–5)
REASON FOR REJECTION: NORMAL
REJECTED TEST: NORMAL
SODIUM BLD-SCNC: 136 MEQ/L (ref 135–144)
SPECIFIC GRAVITY UA: 1.01 (ref 1–1.03)
TOTAL PROTEIN: 6.7 G/DL (ref 6.3–8)
URINE REFLEX TO CULTURE: YES
UROBILINOGEN, URINE: 0.2 E.U./DL
WBC # BLD: 9.7 K/UL (ref 4.8–10.8)
WBC UA: ABNORMAL /HPF (ref 0–5)

## 2023-02-09 PROCEDURE — 80053 COMPREHEN METABOLIC PANEL: CPT

## 2023-02-09 PROCEDURE — 83036 HEMOGLOBIN GLYCOSYLATED A1C: CPT

## 2023-02-09 PROCEDURE — 85027 COMPLETE CBC AUTOMATED: CPT

## 2023-02-09 PROCEDURE — 87186 SC STD MICRODIL/AGAR DIL: CPT

## 2023-02-09 PROCEDURE — 93010 ELECTROCARDIOGRAM REPORT: CPT | Performed by: INTERNAL MEDICINE

## 2023-02-09 PROCEDURE — 93005 ELECTROCARDIOGRAM TRACING: CPT

## 2023-02-09 PROCEDURE — 81001 URINALYSIS AUTO W/SCOPE: CPT

## 2023-02-09 PROCEDURE — 85610 PROTHROMBIN TIME: CPT

## 2023-02-09 PROCEDURE — 85730 THROMBOPLASTIN TIME PARTIAL: CPT

## 2023-02-09 PROCEDURE — 86900 BLOOD TYPING SEROLOGIC ABO: CPT

## 2023-02-09 PROCEDURE — 87088 URINE BACTERIA CULTURE: CPT

## 2023-02-09 PROCEDURE — 86901 BLOOD TYPING SEROLOGIC RH(D): CPT

## 2023-02-09 PROCEDURE — 87086 URINE CULTURE/COLONY COUNT: CPT

## 2023-02-09 PROCEDURE — 86850 RBC ANTIBODY SCREEN: CPT

## 2023-02-09 RX ORDER — CARISOPRODOL 350 MG/1
350 TABLET ORAL 4 TIMES DAILY PRN
COMMUNITY

## 2023-02-09 RX ORDER — SODIUM CHLORIDE 9 MG/ML
INJECTION, SOLUTION INTRAVENOUS PRN
Status: CANCELLED | OUTPATIENT
Start: 2023-02-09

## 2023-02-09 RX ORDER — SODIUM CHLORIDE 0.9 % (FLUSH) 0.9 %
5-40 SYRINGE (ML) INJECTION PRN
Status: CANCELLED | OUTPATIENT
Start: 2023-02-09

## 2023-02-09 RX ORDER — OMEGA-3 FATTY ACIDS/FISH OIL 300-1000MG
1 CAPSULE ORAL
COMMUNITY

## 2023-02-09 RX ORDER — SODIUM CHLORIDE 0.9 % (FLUSH) 0.9 %
5-40 SYRINGE (ML) INJECTION EVERY 12 HOURS SCHEDULED
Status: CANCELLED | OUTPATIENT
Start: 2023-02-09

## 2023-02-09 RX ORDER — ACETAMINOPHEN 325 MG/1
1000 TABLET ORAL ONCE
Status: CANCELLED | OUTPATIENT
Start: 2023-02-09 | End: 2023-02-09

## 2023-02-09 ASSESSMENT — ENCOUNTER SYMPTOMS
WHEEZING: 0
COUGH: 1
SORE THROAT: 0
EYE DISCHARGE: 0
CONSTIPATION: 1
TROUBLE SWALLOWING: 0
NAUSEA: 1
PHOTOPHOBIA: 0
EYE PAIN: 0
FACIAL SWELLING: 0
EYE ITCHING: 1
SINUS PAIN: 0
SINUS PRESSURE: 0
SHORTNESS OF BREATH: 1
ABDOMINAL DISTENTION: 0
CHEST TIGHTNESS: 0
EYE REDNESS: 0
BLOOD IN STOOL: 0
DIARRHEA: 1
ABDOMINAL PAIN: 1
RHINORRHEA: 0
BACK PAIN: 1
VOMITING: 0

## 2023-02-09 NOTE — PROGRESS NOTES
During pts PAT visit she reports during travel screen she recently tested herself for covid at home-(negative) dt symptoms of fatigue, headache, shortness of breath (she reports this is her baseline), and chronic cough (she reports dt smoking). She reports symptoms have been improving. She denies known exposure, fever, chills, loss of taste/sense of smell. Ordered covid rapid swab during her visit-our MA Homer Diego reported after pt left that lab gave her the wrong swab used to swab pt's nose. Pt informed during visit that if her symptoms don't continue to improve or worsen she needs to call and notify surgery schedulers and surgeon/as we will be unable to do surgery if she is sick. Pt verbalizes understanding. Will order rapid covid for day of surgery.

## 2023-02-09 NOTE — H&P
Preoperative Consultation      Name: Kendal Lopez  YOB: 1965  Date of Service: 2/9/2023  PCP: [facility]    CHIEF COMPLAINT:  lumbar post laminectomy syndrome    HISTORY OF PRESENT ILLNESS:      The patient is a 62 y.o. female with significant past medical history of lumbar post laminectomy syndrome, CKDIII, HTN, HPL, CAD, MI w/1 stent 2018 (on plavix)-follows with Dr. Chivo Walton, DM, thyroid disease, anemia, anxiety, asthma, COPD,  who presents for a preoperative consultation at the request of surgeon, Dr. Millie Pickering, who plans on performing spinal cord stimulator trial on 2/14/23 at [facility] [place]. Pt reports she has had chronic back pain since 2006. She reports she has had three prior back surgeries-laminotomies with no relief. She reports she has had injections and tried physical therapy which she reports did not help. She reports she has followed with pain management. She reports her pain is primarily in her lower back and radiates to her bilateral buttocks and bilateral legs/feet. She rates her pain today 9/10 and describes it as \"achy, throbbing, sharp\". She reports she takes motrin, tylenol, muscle relaxer, and aspirin packets. She reports her pain limits her ADL's and interrupts her sleep at night. She reports its difficult for her to walk dt pain-she denies using any assistive devices, denies falls. She reports numbness and tingling in her bilateral legs and feet. She reports constipation and diarrhea \"for years\". She denies any urologic symptoms. Pt reports she last saw cardiology Dr. Meme Mcbride 6 months ago\". I called and spoke with Alvarez Gu from Dr. Millie Pickering office during pt's PAT oliverTraci reports she spoke with Dr. Millie Pickering and he does not need clearance for pt prior to surgery. Pt denies any chest pain, palpitations, chest tightness, lightheadedness, dizziness.  She does report chronic cough \"dt smoking\" and intermittent shortness of breath which she says is her baseline (hx asthma/copd). During pts PAT visit she reports during travel screen she recently tested herself for covid at home-(negative) dt symptoms of fatigue, headache, shortness of breath (she reports this is her baseline), and chronic cough (she reports dt smoking). She reports symptoms have been improving but she reports she is worried about being covid positive. She denies known exposure, fever, chills, loss of taste/sense of smell. Pt informed during visit that if her symptoms don't continue to improve or worsen she needs to call and notify surgery schedulers and surgeon/as we will be unable to do surgery if she is sick. Pt verbalizes understanding. Will order rapid covid for day of surgery. Smoking hx: current smoker 1 pk/day for 40 years    Known Anesthesia Problems: Hx difficulty waking up s/p prior surgeries  Bleeding Risk: Pt takes plavix.  Last dose 2/5/23 told to stop prior to OR per Dr. Donna Jeffrey  Patient Objection to Receiving Blood Products: No  Personal of FH of DVT/PE: No      Past Medical History:        Diagnosis Date    Asthma     CAD (coronary artery disease)     Chronic back pain     COPD (chronic obstructive pulmonary disease) ([facility] [address]     H/O foot surgery     bunion removal    Heart attack ([facility] [address]     History of blood transfusion     2013-anemia    Hyperlipidemia     Hypertension     Hypothyroidism     Kidney disease     Prolonged emergence from general anesthesia      Past Surgical History:    Past Surgical History:   Procedure Laterality Date    BACK SURGERY  [id number]    BUNIONECTOMY      COLONOSCOPY      CORONARY ANGIOPLASTY WITH STENT PLACEMENT      2018-1 stent    ENDOSCOPY, COLON, DIAGNOSTIC      FRACTURE SURGERY      right hand ring finger    LUNG SURGERY      tube placed due to collapsed lung       Allergies:    Gentamicin and Codeine    Medications Prior to Admission:    Current Outpatient Medications   Medication Sig Dispense Refill    ibuprofen (ADVIL;MOTRIN) 200 MG CAPS Take 1 capsule by mouth      Metoprolol Succinate 25 MG CS24 Take 50 mg by mouth daily      raNITIdine HCl (ZANTAC PO) Take by mouth      carisoprodol (SOMA) 350 MG tablet Take 350 mg by mouth 4 times daily as needed for Muscle spasms. Melatonin 5 MG CAPS Take by mouth      BIOTIN PO Take by mouth      levothyroxine (SYNTHROID) 25 MCG tablet Take 1 tablet by mouth Daily 30 tablet 0    ondansetron (ZOFRAN) 4 MG tablet Take 1 tablet by mouth daily as needed for Nausea or Vomiting 30 tablet 0    FLUoxetine (PROZAC) 20 MG capsule Take 1 capsule by mouth daily 90 capsule 1    Handicap Placard MISC by Does not apply route 1 each 0    omeprazole (PRILOSEC) 20 MG delayed release capsule Take 1 capsule by mouth in the morning. (Patient not taking: Reported on 2/9/2023) 90 capsule 0    varenicline (CHANTIX) 0.5 MG tablet Take 1-2 tablets by mouth See Admin Instructions 0.5mg DAILY for 3 days followed by 0.5mg TWICE DAILY for 4 days followed by 1mg TWICE DAILY (Patient not taking: Reported on 2/9/2023) 57 tablet 0    varenicline (CHANTIX) 1 MG tablet Take 1 tablet by mouth in the morning and 1 tablet before bedtime.  (Patient not taking: Reported on 2/9/2023) 180 tablet 1    diclofenac sodium (VOLTAREN) 1 % GEL Apply 4 g topically 2 times daily 150 g 2    Semaglutide-Weight Management (WEGOVY) 1 MG/0.5ML SOAJ SC injection Inject 1 mg into the skin every 7 days (Patient not taking: Reported on 2/9/2023) 2 mL 1    Semaglutide,0.25 or 0.5MG/DOS, (OZEMPIC, 0.25 OR 0.5 MG/DOSE,) 2 MG/1.5ML SOPN Sample Lot [id number] Exp 11/30/24 1 pen 0    fluticasone (FLONASE) 50 MCG/ACT nasal spray 2 sprays by Nasal route daily 16 g 5    clopidogrel (PLAVIX) 75 MG tablet Take 1 tablet by mouth daily 30 tablet 5    atorvastatin (LIPITOR) 80 MG tablet atorvastatin 80 mg tablet (Patient not taking: Reported on 2/9/2023) 30 tablet 5    albuterol sulfate HFA (VENTOLIN HFA) 108 (90 Base) MCG/ACT inhaler Ventolin HFA 90 mcg/actuation aerosol inhaler 2 puffs prn 1 each 0    Aspirin (ECOTRIN LOW STRENGTH PO) daily       albuterol (PROVENTIL) (2.5 MG/3ML) 0.083% nebulizer solution Inhale 2.5 mg into the lungs       nitroGLYCERIN (NITROSTAT) 0.4 MG SL tablet nitroglycerin 0.4 mg sublingual tablet       No current facility-administered medications for this encounter. Social History:   Social History     Socioeconomic History    Marital status: Single     Spouse name: Not on file    Number of children: Not on file    Years of education: Not on file    Highest education level: Not on file   Occupational History    Not on file   Tobacco Use    Smoking status: Every Day     Packs/day: 1.00     Years: 40.00     Pack years: 40.00     Types: Cigarettes     Start date: 12/4/1976    Smokeless tobacco: Never   Vaping Use    Vaping Use: Former   Substance and Sexual Activity    Alcohol use: Yes     Alcohol/week: 10.0 standard drinks     Types: 10 Cans of beer per week     Comment: socially    Drug use: Not Currently    Sexual activity: Not Currently   Other Topics Concern    Not on file   Social History Narrative    Not on file     Social Determinants of Health     Financial Resource Strain: Low Risk     Difficulty of Paying Living Expenses: Not hard at all   Food Insecurity: No Food Insecurity    Worried About Running Out of Food in the Last Year: Never true    Ran Out of Food in the Last Year: Never true   Transportation Needs: Not on file   Physical Activity: Not on file   Stress: Not on file   Social Connections: Not on file   Intimate Partner Violence: Not on file   Housing Stability: Not on file       Family History:       Problem Relation Age of Onset    Heart Disease Mother     Other Mother         septic    Kidney Disease Father     Heart Disease Father     Asthma Sister     ADHD Son     ADHD Daughter     Bipolar Disorder Daughter        Review of Systems   Constitutional:  Positive for appetite change (appetite fluctuations dt nausea \"for years\") and fatigue. Negative for chills, diaphoresis, fever and unexpected weight change. HENT:  Positive for tinnitus (chronic bilateral ears). Negative for congestion, ear discharge, ear pain, facial swelling, hearing loss, mouth sores, nosebleeds, postnasal drip, rhinorrhea, sinus pressure, sinus pain, sneezing, sore throat and trouble swallowing. Eyes:  Positive for itching. Negative for photophobia, pain, discharge, redness and visual disturbance. Wears glasses   Respiratory:  Positive for cough (reports this is her baseline dt smoking) and shortness of breath (intermittent-reports this is her baseline (hx asthma/copd)). Negative for chest tightness and wheezing. Hx asthma/COPD   Cardiovascular:  Negative for chest pain, palpitations and leg swelling. Gastrointestinal:  Positive for abdominal pain (intermittent), constipation (intermittent), diarrhea (daily) and nausea (intermittent). Negative for abdominal distention, blood in stool and vomiting. Pt reports she has daily diarrhea (in the morning) and almost daily nausea for years. She reports she has intermittent constipation. She reports her PCP is aware. Endocrine: Negative. Genitourinary:  Positive for frequency and urgency. Negative for decreased urine volume, difficulty urinating, dysuria, flank pain and hematuria. Musculoskeletal:  Positive for back pain, gait problem, neck pain and neck stiffness. Negative for arthralgias and myalgias. Skin:  Negative for rash and wound. Neurological:  Positive for numbness (bilateral legs and feet) and headaches. Negative for dizziness, tremors, seizures, syncope, weakness and light-headedness. Hematological:  Bruises/bleeds easily (on plavix).    Psychiatric/Behavioral:          Hx depression/anxiety-on prozac      Vitals:  BP (!) 119/54   Pulse 81   Temp 97.6 °F (36.4 °C) (Temporal)   Resp 16   Ht 5' (1.524 m)   Wt 162 lb 3.2 oz (73.6 kg)   SpO2 98%   BMI 31.68 kg/m²       Physical Exam  Constitutional:       General: She is not in acute distress. Appearance: Normal appearance. She is not ill-appearing, toxic-appearing or diaphoretic. HENT:      Head: Normocephalic and atraumatic. Right Ear: Tympanic membrane, ear canal and external ear normal. There is no impacted cerumen. Left Ear: Tympanic membrane, ear canal and external ear normal. There is no impacted cerumen. Nose: Nose normal. No congestion or rhinorrhea. Mouth/Throat:      Mouth: Mucous membranes are moist.      Pharynx: Oropharynx is clear. No oropharyngeal exudate or posterior oropharyngeal erythema. Eyes:      General: No scleral icterus. Right eye: No discharge. Left eye: No discharge. Extraocular Movements: Extraocular movements intact. Conjunctiva/sclera: Conjunctivae normal.      Pupils: Pupils are equal, round, and reactive to light. Cardiovascular:      Rate and Rhythm: Normal rate and regular rhythm. Pulses: Normal pulses. Heart sounds: Normal heart sounds. No murmur heard. Pulmonary:      Effort: Pulmonary effort is normal. No respiratory distress. Breath sounds: Normal breath sounds. No wheezing. Abdominal:      General: Bowel sounds are normal. There is no distension. Palpations: Abdomen is soft. Tenderness: There is no abdominal tenderness. There is no right CVA tenderness, left CVA tenderness or guarding. Genitourinary:     Comments: Deferred  Musculoskeletal:         General: No swelling. Normal range of motion. Cervical back: Normal range of motion. No rigidity. Right lower leg: No edema. Left lower leg: No edema. Lymphadenopathy:      Cervical: No cervical adenopathy. Skin:     General: Skin is warm and dry. Capillary Refill: Capillary refill takes less than 2 seconds. Coloration: Skin is not jaundiced. Findings: No bruising, erythema or rash.       Comments: Lower middle back healed scar Neurological:      General: No focal deficit present. Mental Status: She is alert and oriented to person, place, and time. Motor: No weakness. Gait: Gait normal.   Psychiatric:         Mood and Affect: Mood normal.         Behavior: Behavior normal.         Thought Content: Thought content normal.         Judgment: Judgment normal.       Assessment:  62 y.o. patient with   Patient Active Problem List   Diagnosis    Inflammation of sacroiliac joint (HCC)    Asthma-chronic obstructive pulmonary disease overlap syndrome (HCC)    Cervical spondylosis without myelopathy    Chronic low back pain    Coronary artery disease involving native heart without angina pectoris    Degeneration of lumbar intervertebral disc    Hypothyroidism    Lumbar post-laminectomy syndrome    Lumbosacral spondylosis without myelopathy    Mixed hyperlipidemia    PMB (postmenopausal bleeding)    Chronic renal disease, stage III ([facility]) [314956]    Anxiety    Essential hypertension    Personal history of tobacco use    Obesity due to excess calories with serious comorbidity    Dorsalgia, unspecified    Hx of coronary artery disease    Chronic pain syndrome    Depressive disorder    Myocardial infarction ([facility] [address]    Opiate overdose ([facility] [address]    Type 2 diabetes mellitus    Anemia      with planned surgery as above.     Plan:  Preoperative workup as follows: PAT, CBC, CMP, PTT, PT/INR, T&S, urine with reflex to culture, hemoglobin A1C, covid rapid swab, EKG  2.   Scheduled for: spinal cord stimulator trial on 2/14/23    OCTAVIANO Shaw CNP  2/9/2023  3:25 PM

## 2023-02-11 LAB
ORGANISM: ABNORMAL
URINE CULTURE, ROUTINE: ABNORMAL
URINE CULTURE, ROUTINE: ABNORMAL

## 2023-02-13 ENCOUNTER — TELEPHONE (OUTPATIENT)
Dept: NEUROSURGERY | Age: 58
End: 2023-02-13

## 2023-02-13 NOTE — TELEPHONE ENCOUNTER
SURG DATE 2/14/23:  SPINAL CORD STIMULATOR TRIAL    PT LM @ 10:55AM ASKING IF HER TEST RESULTS ARE BACK FROM PRE-TESTING. SHE IS ASKING ABOUT IF SHE HAS A UTI AND WHAT HER A1C IS.    COULD YOU  PLEASE REVIEW AND ADVISE IF SURGERY NEEDS TO BE POSTPONED.

## 2023-02-23 DIAGNOSIS — F41.1 GAD (GENERALIZED ANXIETY DISORDER): ICD-10-CM

## 2023-02-23 RX ORDER — FLUOXETINE HYDROCHLORIDE 20 MG/1
CAPSULE ORAL
Qty: 90 CAPSULE | Refills: 1 | OUTPATIENT
Start: 2023-02-23

## 2023-03-03 ENCOUNTER — TELEPHONE (OUTPATIENT)
Dept: INTERNAL MEDICINE | Age: 58
End: 2023-03-03

## 2023-03-09 ENCOUNTER — TELEPHONE (OUTPATIENT)
Dept: INTERNAL MEDICINE | Age: 58
End: 2023-03-09

## 2023-04-06 ENCOUNTER — TELEPHONE (OUTPATIENT)
Dept: NEUROSURGERY | Age: 58
End: 2023-04-06

## 2023-04-06 NOTE — TELEPHONE ENCOUNTER
POSTPONED SURGERY:  2/14/23 - SPINAL CORD STIM TRIAL  (PT HAD UTI WITH TRACES OF E-COLI)      PLEASE SEE PTS 3/29/23 LABS FROM [facility] THAT WERE SCANNED INTO THE MEDIA TAB TODAY.     PLEASE ADVISE IF PATIENT IS CLEARED TO BE SCHEDULED FOR THE PROCEDURE ON 4/11/2023

## 2023-04-06 NOTE — TELEPHONE ENCOUNTER
CALLED PT @ 4:05PM AND PT STATES SHE DOES NOT HAVE A PCP AT THIS TIME AND HER FIRST APPT WITH A NEW PCP IS AT THE END OF THE MONTH. SHE STATES THEY WILL ONLY EVALUATE HER AS A NEW PATIENT AND NOT REVIEW THE UTI AT THAT TIME, SHE WILL NEED TO MAKE ANOTHER APPT. SHE IS VERY UPSET AND NEEDED TO HANG UP. SHE DOESN'T UNDERSTAND AND NEEDS TO COOL DOWN.

## 2023-04-13 ENCOUNTER — HOSPITAL ENCOUNTER (OUTPATIENT)
Age: 58
Setting detail: OUTPATIENT SURGERY
Discharge: HOME | End: 2023-04-13
Attending: PAIN MEDICINE | Admitting: PAIN MEDICINE
Payer: MEDICARE

## 2023-04-13 VITALS
HEART RATE: 88 BPM | OXYGEN SATURATION: 99 % | HEIGHT: 60 IN | SYSTOLIC BLOOD PRESSURE: 143 MMHG | WEIGHT: 170 LBS | BODY MASS INDEX: 33.38 KG/M2 | DIASTOLIC BLOOD PRESSURE: 80 MMHG | RESPIRATION RATE: 18 BRPM | TEMPERATURE: 97.2 F

## 2023-04-13 LAB
BACTERIA URNS QL MICRO: ABNORMAL /HPF
BILIRUB UR QL STRIP: NEGATIVE
CLARITY UR: CLEAR
COLOR UR: YELLOW
EPI CELLS #/AREA URNS AUTO: ABNORMAL /HPF (ref 0–5)
GLUCOSE UR STRIP-MCNC: NEGATIVE MG/DL
HGB UR QL STRIP: ABNORMAL
HYALINE CASTS #/AREA URNS AUTO: ABNORMAL /HPF (ref 0–5)
KETONES UR STRIP-MCNC: NEGATIVE MG/DL
LEUKOCYTE ESTERASE UR QL STRIP: ABNORMAL
NITRITE UR QL STRIP: NEGATIVE
PH UR STRIP: 6.5 [PH] (ref 5–9)
PROT UR STRIP-MCNC: 100 MG/DL
RBC #/AREA URNS AUTO: ABNORMAL /HPF (ref 0–5)
SP GR UR STRIP: 1.01 (ref 1–1.03)
URINE REFLEX TO CULTURE: YES
UROBILINOGEN UR STRIP-ACNC: 0.2 E.U./DL
WBC #/AREA URNS AUTO: ABNORMAL /HPF (ref 0–5)

## 2023-04-13 PROCEDURE — 2580000003 HC RX 258: Performed by: ANESTHESIOLOGY

## 2023-04-13 PROCEDURE — 2580000003 HC RX 258: Performed by: PAIN MEDICINE

## 2023-04-13 PROCEDURE — 87088 URINE BACTERIA CULTURE: CPT

## 2023-04-13 PROCEDURE — 81001 URINALYSIS AUTO W/SCOPE: CPT

## 2023-04-13 PROCEDURE — 2720000010 HC SURG SUPPLY STERILE: Performed by: PAIN MEDICINE

## 2023-04-13 PROCEDURE — 3700000001 HC ADD 15 MINUTES (ANESTHESIA): Performed by: PAIN MEDICINE

## 2023-04-13 PROCEDURE — 63650 IMPLANT NEUROELECTRODES: CPT | Performed by: PAIN MEDICINE

## 2023-04-13 PROCEDURE — 7100000010 HC PHASE II RECOVERY - FIRST 15 MIN: Performed by: PAIN MEDICINE

## 2023-04-13 PROCEDURE — 3600000004 HC SURGERY LEVEL 4 BASE: Performed by: PAIN MEDICINE

## 2023-04-13 PROCEDURE — 87186 SC STD MICRODIL/AGAR DIL: CPT

## 2023-04-13 PROCEDURE — 2500000003 HC RX 250 WO HCPCS: Performed by: PAIN MEDICINE

## 2023-04-13 PROCEDURE — 87086 URINE CULTURE/COLONY COUNT: CPT

## 2023-04-13 PROCEDURE — 2709999900 HC NON-CHARGEABLE SUPPLY: Performed by: PAIN MEDICINE

## 2023-04-13 PROCEDURE — 7100000011 HC PHASE II RECOVERY - ADDTL 15 MIN: Performed by: PAIN MEDICINE

## 2023-04-13 PROCEDURE — 3600000014 HC SURGERY LEVEL 4 ADDTL 15MIN: Performed by: PAIN MEDICINE

## 2023-04-13 PROCEDURE — A4217 STERILE WATER/SALINE, 500 ML: HCPCS | Performed by: PAIN MEDICINE

## 2023-04-13 PROCEDURE — 3700000000 HC ANESTHESIA ATTENDED CARE: Performed by: PAIN MEDICINE

## 2023-04-13 PROCEDURE — C1897 LEAD, NEUROSTIM TEST KIT: HCPCS | Performed by: PAIN MEDICINE

## 2023-04-13 PROCEDURE — 6370000000 HC RX 637 (ALT 250 FOR IP): Performed by: ANESTHESIOLOGY

## 2023-04-13 DEVICE — KIT LD L60CM 1X8 COMP TRL SCRN FOR SACR NRV STIM VECTRIS: Type: IMPLANTABLE DEVICE | Site: VERTEBRAE | Status: FUNCTIONAL

## 2023-04-13 RX ORDER — SODIUM CHLORIDE 9 MG/ML
25 INJECTION, SOLUTION INTRAVENOUS PRN
Status: CANCELLED | OUTPATIENT
Start: 2023-04-13

## 2023-04-13 RX ORDER — SODIUM CHLORIDE 0.9 % (FLUSH) 0.9 %
5-40 SYRINGE (ML) INJECTION PRN
Status: CANCELLED | OUTPATIENT
Start: 2023-04-13

## 2023-04-13 RX ORDER — LIDOCAINE HYDROCHLORIDE 10 MG/ML
INJECTION, SOLUTION EPIDURAL; INFILTRATION; INTRACAUDAL; PERINEURAL PRN
Status: DISCONTINUED | OUTPATIENT
Start: 2023-04-13 | End: 2023-04-13 | Stop reason: ALTCHOICE

## 2023-04-13 RX ORDER — ONDANSETRON 2 MG/ML
4 INJECTION INTRAMUSCULAR; INTRAVENOUS
Status: CANCELLED | OUTPATIENT
Start: 2023-04-13 | End: 2023-04-14

## 2023-04-13 RX ORDER — SODIUM CHLORIDE, SODIUM LACTATE, POTASSIUM CHLORIDE, CALCIUM CHLORIDE 600; 310; 30; 20 MG/100ML; MG/100ML; MG/100ML; MG/100ML
INJECTION, SOLUTION INTRAVENOUS CONTINUOUS
Status: DISCONTINUED | OUTPATIENT
Start: 2023-04-13 | End: 2023-04-13 | Stop reason: HOSPADM

## 2023-04-13 RX ORDER — METOCLOPRAMIDE HYDROCHLORIDE 5 MG/ML
10 INJECTION INTRAMUSCULAR; INTRAVENOUS
Status: CANCELLED | OUTPATIENT
Start: 2023-04-13 | End: 2023-04-14

## 2023-04-13 RX ORDER — MEPERIDINE HYDROCHLORIDE 25 MG/ML
12.5 INJECTION INTRAMUSCULAR; INTRAVENOUS; SUBCUTANEOUS
Status: CANCELLED | OUTPATIENT
Start: 2023-04-13 | End: 2023-04-14

## 2023-04-13 RX ORDER — SODIUM CHLORIDE 0.9 % (FLUSH) 0.9 %
5-40 SYRINGE (ML) INJECTION EVERY 12 HOURS SCHEDULED
Status: CANCELLED | OUTPATIENT
Start: 2023-04-13

## 2023-04-13 RX ORDER — DIPHENHYDRAMINE HYDROCHLORIDE 50 MG/ML
12.5 INJECTION INTRAMUSCULAR; INTRAVENOUS
Status: CANCELLED | OUTPATIENT
Start: 2023-04-13 | End: 2023-04-14

## 2023-04-13 RX ORDER — MAGNESIUM HYDROXIDE 1200 MG/15ML
LIQUID ORAL CONTINUOUS PRN
Status: COMPLETED | OUTPATIENT
Start: 2023-04-13 | End: 2023-04-13

## 2023-04-13 RX ORDER — OXYCODONE HYDROCHLORIDE 5 MG/1
5 TABLET ORAL
Status: CANCELLED | OUTPATIENT
Start: 2023-04-13 | End: 2023-04-14

## 2023-04-13 RX ORDER — FENTANYL CITRATE 0.05 MG/ML
50 INJECTION, SOLUTION INTRAMUSCULAR; INTRAVENOUS EVERY 10 MIN PRN
Status: CANCELLED | OUTPATIENT
Start: 2023-04-13

## 2023-04-13 RX ORDER — METOPROLOL SUCCINATE 50 MG/1
50 TABLET, EXTENDED RELEASE ORAL
Status: COMPLETED | OUTPATIENT
Start: 2023-04-13 | End: 2023-04-13

## 2023-04-13 RX ORDER — CEFAZOLIN SODIUM IN 0.9 % NACL 2 G/100 ML
2000 PLASTIC BAG, INJECTION (ML) INTRAVENOUS ONCE
Status: COMPLETED | OUTPATIENT
Start: 2023-04-13 | End: 2023-04-13

## 2023-04-13 RX ADMIN — METOPROLOL SUCCINATE 50 MG: 50 TABLET, EXTENDED RELEASE ORAL at 11:10

## 2023-04-13 RX ADMIN — SODIUM CHLORIDE, POTASSIUM CHLORIDE, SODIUM LACTATE AND CALCIUM CHLORIDE: 600; 310; 30; 20 INJECTION, SOLUTION INTRAVENOUS at 11:18

## 2023-04-13 ASSESSMENT — PAIN DESCRIPTION - LOCATION: LOCATION: BACK;HIP

## 2023-04-13 ASSESSMENT — PAIN SCALES - GENERAL: PAINLEVEL_OUTOF10: 9

## 2023-04-13 ASSESSMENT — PAIN DESCRIPTION - ORIENTATION: ORIENTATION: RIGHT;LEFT

## 2023-04-15 LAB
BACTERIA UR CULT: ABNORMAL
BACTERIA UR CULT: ABNORMAL
ORGANISM: ABNORMAL

## 2023-04-19 ENCOUNTER — OFFICE VISIT (OUTPATIENT)
Dept: PAIN MANAGEMENT | Age: 58
End: 2023-04-19

## 2023-04-19 DIAGNOSIS — M96.1 LUMBAR POST-LAMINECTOMY SYNDROME: Primary | ICD-10-CM

## 2023-04-19 PROCEDURE — 99024 POSTOP FOLLOW-UP VISIT: CPT | Performed by: PAIN MEDICINE

## 2023-04-19 ASSESSMENT — ENCOUNTER SYMPTOMS
EYES NEGATIVE: 1
ALLERGIC/IMMUNOLOGIC NEGATIVE: 1
GASTROINTESTINAL NEGATIVE: 1
RESPIRATORY NEGATIVE: 1

## 2023-04-19 NOTE — PROGRESS NOTES
Exam  Constitutional:       Appearance: Normal appearance. She is normal weight. HENT:      Head: Normocephalic and atraumatic. Nose: Nose normal.      Mouth/Throat:      Mouth: Mucous membranes are moist.   Eyes:      Extraocular Movements: Extraocular movements intact. Conjunctiva/sclera: Conjunctivae normal.      Pupils: Pupils are equal, round, and reactive to light. Cardiovascular:      Rate and Rhythm: Normal rate and regular rhythm. Pulses: Normal pulses. Heart sounds: Normal heart sounds. Pulmonary:      Effort: Pulmonary effort is normal.   Abdominal:      General: Abdomen is flat. Bowel sounds are normal.      Palpations: Abdomen is soft. Musculoskeletal:         General: Normal range of motion. Cervical back: Normal range of motion and neck supple. Skin:     General: Skin is warm. Neurological:      General: No focal deficit present. Mental Status: She is alert and oriented to person, place, and time. Mental status is at baseline. Cranial Nerves: No cranial nerve deficit. Sensory: No sensory deficit. Motor: No weakness. Psychiatric:         Mood and Affect: Mood normal.         Behavior: Behavior normal.         Thought Content:  Thought content normal.         Judgment: Judgment normal.        --Hayes Ching MD

## 2023-04-20 ENCOUNTER — TELEPHONE (OUTPATIENT)
Dept: PAIN MANAGEMENT | Age: 58
End: 2023-04-20

## 2023-04-20 NOTE — TELEPHONE ENCOUNTER
PT LEFT MESSAGE STATING SHE FORGOT TO ASK DR. TYLER AT HER APPT YESTERDAY (STIMULATOR TRIAL WAS REMOVED) IF HE WOULD PRESCRIBE SOMETHING FOR PAIN? PT HAS TYLENOL AND IBUPROFEN OVER THE COUNTER WHICH ISN'T HELPING AND STATES SHE IS MISERABLE, GOING TO PULL HER HAIR OUT. PT USES [facility] IN [place]. WILL DR. Elodia Russell?

## 2023-04-21 NOTE — TELEPHONE ENCOUNTER
Patient called and left another message regarding yesterdays message. Will Dr Seema Tobin please advise.

## 2023-05-03 ENCOUNTER — OFFICE VISIT (OUTPATIENT)
Dept: PAIN MANAGEMENT | Age: 58
End: 2023-05-03
Payer: MEDICARE

## 2023-05-03 VITALS
WEIGHT: 166 LBS | TEMPERATURE: 97.8 F | SYSTOLIC BLOOD PRESSURE: 138 MMHG | HEIGHT: 60 IN | BODY MASS INDEX: 32.59 KG/M2 | DIASTOLIC BLOOD PRESSURE: 80 MMHG

## 2023-05-03 DIAGNOSIS — M96.1 LUMBAR POST-LAMINECTOMY SYNDROME: Primary | ICD-10-CM

## 2023-05-03 DIAGNOSIS — M51.36 DEGENERATION OF LUMBAR INTERVERTEBRAL DISC: ICD-10-CM

## 2023-05-03 DIAGNOSIS — M47.817 LUMBOSACRAL SPONDYLOSIS WITHOUT MYELOPATHY: ICD-10-CM

## 2023-05-03 PROCEDURE — G8417 CALC BMI ABV UP PARAM F/U: HCPCS | Performed by: PAIN MEDICINE

## 2023-05-03 PROCEDURE — 4004F PT TOBACCO SCREEN RCVD TLK: CPT | Performed by: PAIN MEDICINE

## 2023-05-03 PROCEDURE — 3075F SYST BP GE 130 - 139MM HG: CPT | Performed by: PAIN MEDICINE

## 2023-05-03 PROCEDURE — 3079F DIAST BP 80-89 MM HG: CPT | Performed by: PAIN MEDICINE

## 2023-05-03 PROCEDURE — 99213 OFFICE O/P EST LOW 20 MIN: CPT | Performed by: PAIN MEDICINE

## 2023-05-03 PROCEDURE — G8427 DOCREV CUR MEDS BY ELIG CLIN: HCPCS | Performed by: PAIN MEDICINE

## 2023-05-03 PROCEDURE — 3017F COLORECTAL CA SCREEN DOC REV: CPT | Performed by: PAIN MEDICINE

## 2023-05-03 ASSESSMENT — ENCOUNTER SYMPTOMS
ALLERGIC/IMMUNOLOGIC NEGATIVE: 1
GASTROINTESTINAL NEGATIVE: 1
EYES NEGATIVE: 1
RESPIRATORY NEGATIVE: 1

## 2023-05-03 NOTE — PROGRESS NOTES
Troy Castellon (:  1965) is a 62 y.o. female,Established patient, here for evaluation of the following chief complaint(s):  Back Pain (Lower left side into hip area) and Hip Pain         ASSESSMENT/PLAN:  1. Lumbar post-laminectomy syndrome  2. Degeneration of lumbar intervertebral disc  3. Lumbosacral spondylosis without myelopathy     Patient presents with complaint of back pain. Mostly in the back with occational Radiation, Had Epidurals, Laminotomies with no Relief, MRI shows Pedicle screws inserted at the L3, L4 and L5 levels bilaterally. Intervertebral spacers have been inserted at L3-4 and L4-5 levels and cause artifacts. No abnormal sites of enhancement. Last PT did not help and made it worse. Was on narcs and had a suicidal attempt with them in the past with those Narcs, On Plavix since 3 years for a cardiac stent, Had a stim trial with not enough relief. Stim leads D/Jameel intact. Will follow up in 2 weeks after healing of her stim site. Here today asking for pump Trial    Subjective   SUBJECTIVE/OBJECTIVE:  Patient presents with complaint of back pain. Mostly in the back with occational Radiation, Had Epidurals, Laminotomies with no Relief. MRI shows Pedicle screws inserted at the L3, L4 and L5 levels bilaterally. Intervertebral spacers have been inserted at L3-4 and L4-5 levels and cause artifacts. No abnormal sites of enhancement. Last PT did not help and made it worse. Was on narcs and had a suicidal attempt with them in the past, On Plavix since 3 years for a cardiac stent,  Plan was for a stim trial that did not help here asking for pump trial as a next step   She Found another psychiatrist Dr Bari Corcoran at [facility] Has a favourable psych eval .     Review of Systems   Constitutional: Negative. HENT: Negative. Eyes: Negative. Respiratory: Negative. H/o COPD   Cardiovascular: Negative. Single stent 3 years ago   Gastrointestinal: Negative. Endocrine: Negative.

## 2023-05-09 ENCOUNTER — TELEPHONE (OUTPATIENT)
Dept: PAIN MANAGEMENT | Age: 58
End: 2023-05-09

## 2023-05-09 NOTE — TELEPHONE ENCOUNTER
IT MORPHINE PUMP TRIAL    NO AUTH REQUIRED    OK to schedule procedure approved as above. Please note sides/levels approved and date range.    (If applicable, sides/levels approved may differ from those ordered)    TO BE SCHEDULED WITH DR. Yeni Bland

## 2023-05-15 ENCOUNTER — TELEPHONE (OUTPATIENT)
Dept: PAIN MANAGEMENT | Age: 58
End: 2023-05-15

## 2023-05-15 NOTE — TELEPHONE ENCOUNTER
CREATED AND FAXED PUMP TRIAL ORDER TO [facility] ([facility] ORDER ID: [id number]     EXPECTED DELIVERY DATE:  05/17/2023     TRIAL PROCEDURE DATE: 05/18/2023       MEDICATION ORDERED/CONCENTRATION/UNIT    PF Morphine Sulfate 0.1 mg/mL  PF Bupivacaine HCl 0.1 mg/mL    TOTAL VOLUME 5 (five) mL     [facility] ORDER ATTACHED TO THIS ENCOUNTER.

## 2023-05-17 NOTE — TELEPHONE ENCOUNTER
RECEIVED MEDICATION FOR PUMP TRIAL. MEDICATION IS IN THE POST-OP ROOM IN THE LEFT CABINET.      MEDICATION USE BY DATE: 05/19/2023

## 2023-05-18 ENCOUNTER — PROCEDURE VISIT (OUTPATIENT)
Dept: PAIN MANAGEMENT | Age: 58
End: 2023-05-18

## 2023-05-18 DIAGNOSIS — M96.1 LUMBAR POST-LAMINECTOMY SYNDROME: ICD-10-CM

## 2023-05-18 ASSESSMENT — ENCOUNTER SYMPTOMS
RESPIRATORY NEGATIVE: 1
EYES NEGATIVE: 1
GASTROINTESTINAL NEGATIVE: 1
ALLERGIC/IMMUNOLOGIC NEGATIVE: 1

## 2023-05-18 NOTE — PROGRESS NOTES
Sara Hilario (:  1965) is a 62 y.o. female,Established patient, here for evaluation of the following chief complaint(s):  Back Pain         ASSESSMENT/PLAN:  1. Lumbar post-laminectomy syndrome  -     WA NJX DX/THER SBST INTRLMNR LMBR/SAC W/IMG GDN     Patient presents for trial of IT morphine with complaint of back pain. Mostly in the back with occational Radiation, Had Epidurals, Laminotomies with no Relief, MRI shows Pedicle screws inserted at the L3, L4 and L5 levels bilaterally. Intervertebral spacers have been inserted at L3-4 and L4-5 levels and cause artifacts. No abnormal sites of enhancement. Last PT did not help and made it worse. Was on narcs and had a suicidal attempt with them in the past with those Narcs, On Plavix since 3 years for a cardiac stent, stopped this 1 week ago. Had a stim trial with not enough relief. Intrathecal Morphine Trial:  Discussed Procedure, Pt taken to Procedure Room and placed in Prone pojistion, Site identified by fluroscopy, and prepped with Betadine, anesthetised with 1% Lidocaine, 25G styletted needle inserted into L3-4spinal space, confirmed with return of CSF with a blood ting, , Injected 0.2mg Bupivacaine and 0.2mg Morphine preservative free in total volume 2cc with Minimal relief of pain pt observed over 2 hrs and discharged home in stable condition. She was followed on the phone till evening when she started improvement of pain significantly up to 100% relief      Subjective   SUBJECTIVE/OBJECTIVE:  Patient presents with complaint of back pain. Mostly in the back with occational Radiation, Had Epidurals, Laminotomies with no Relief. MRI shows Pedicle screws inserted at the L3, L4 and L5 levels bilaterally. Intervertebral spacers have been inserted at L3-4 and L4-5 levels and cause artifacts. No abnormal sites of enhancement. Last PT did not help and made it worse.  Was on narcs and had a suicidal attempt with them in the past, On Plavix since 3

## 2023-05-24 ENCOUNTER — OFFICE VISIT (OUTPATIENT)
Dept: PAIN MANAGEMENT | Age: 58
End: 2023-05-24
Payer: MEDICARE

## 2023-05-24 VITALS
DIASTOLIC BLOOD PRESSURE: 86 MMHG | WEIGHT: 170 LBS | BODY MASS INDEX: 33.38 KG/M2 | HEIGHT: 60 IN | TEMPERATURE: 97.8 F | SYSTOLIC BLOOD PRESSURE: 136 MMHG | HEART RATE: 110 BPM | OXYGEN SATURATION: 99 %

## 2023-05-24 DIAGNOSIS — M96.1 LUMBAR POST-LAMINECTOMY SYNDROME: Primary | ICD-10-CM

## 2023-05-24 DIAGNOSIS — M96.1 LUMBAR POST-LAMINECTOMY SYNDROME: ICD-10-CM

## 2023-05-24 PROCEDURE — 4004F PT TOBACCO SCREEN RCVD TLK: CPT | Performed by: PAIN MEDICINE

## 2023-05-24 PROCEDURE — G8427 DOCREV CUR MEDS BY ELIG CLIN: HCPCS | Performed by: PAIN MEDICINE

## 2023-05-24 PROCEDURE — 3017F COLORECTAL CA SCREEN DOC REV: CPT | Performed by: PAIN MEDICINE

## 2023-05-24 PROCEDURE — 3075F SYST BP GE 130 - 139MM HG: CPT | Performed by: PAIN MEDICINE

## 2023-05-24 PROCEDURE — G8417 CALC BMI ABV UP PARAM F/U: HCPCS | Performed by: PAIN MEDICINE

## 2023-05-24 PROCEDURE — 3079F DIAST BP 80-89 MM HG: CPT | Performed by: PAIN MEDICINE

## 2023-05-24 PROCEDURE — 99213 OFFICE O/P EST LOW 20 MIN: CPT | Performed by: PAIN MEDICINE

## 2023-05-24 ASSESSMENT — ENCOUNTER SYMPTOMS
GASTROINTESTINAL NEGATIVE: 1
ALLERGIC/IMMUNOLOGIC NEGATIVE: 1
RESPIRATORY NEGATIVE: 1
EYES NEGATIVE: 1

## 2023-05-24 NOTE — PROGRESS NOTES
Elliot Hernandez (:  1965) is a 62 y.o. female,Established patient, here for evaluation of the following chief complaint(s):  Follow-up and Back Pain         ASSESSMENT/PLAN:  1. Lumbar post-laminectomy syndrome  -     62350-IMPLANT/REVISN/REPOSITION INTRATHECAL/EP; Future  -     [id number]- REMOVAL, PREVIOUSLY IMPLANTED INTRATHECA; Future     Patient presents for Follow up trial of IT morphine with complaint of back pain. 100% Relief of pain with some itching. Plan on Pump placement. Subjective   SUBJECTIVE/OBJECTIVE:  Patient presents with complaint of back pain. Mostly in the back with occational Radiation, Had Epidurals, Laminotomies with no Relief. MRI shows Pedicle screws inserted at the L3, L4 and L5 levels bilaterally. Intervertebral spacers have been inserted at L3-4 and L4-5 levels and cause artifacts. No abnormal sites of enhancement. Last PT did not help and made it worse. Was on narcs and had a suicidal attempt with them in the past, On Plavix since 3 years for a cardiac stent,  Plan was for a stim trial that did not help  She Found another psychiatrist Dr Felipa Ramos at [facility] Has a favourable psych eval . Patient presents for Follow up trial of IT morphine with complaint of back pain. 100% Relief of pain with some itching. Review of Systems   Constitutional: Negative. HENT: Negative. Eyes: Negative. Respiratory: Negative. H/o COPD   Cardiovascular: Negative. Single stent 3 years ago   Gastrointestinal: Negative. Endocrine: Negative. Genitourinary: Negative. Musculoskeletal: Negative. Skin: Negative. Allergic/Immunologic: Negative. Neurological: Negative. Hematological: Negative. Psychiatric/Behavioral: Negative. All other systems reviewed and are negative. Objective   Physical Exam  Constitutional:       Appearance: Normal appearance. She is normal weight. HENT:      Head: Normocephalic and atraumatic.       Nose: Nose normal.

## 2023-05-24 NOTE — TELEPHONE ENCOUNTER
Requested Prescriptions     Pending Prescriptions Disp Refills    carisoprodol (SOMA) 350 MG tablet 90 tablet 0     Sig: Take 1 tablet by mouth 3 times daily as needed for Muscle spasms for up to 30 days.  Max Daily Amount: 1,050 mg       Patient last seen on: 05/24/23   Date of last refill: 04/24/23  Future appts: Pt returning for inj      Patient wants to know if you can fill her prescription for soma

## 2023-05-25 RX ORDER — CARISOPRODOL 350 MG/1
350 TABLET ORAL 3 TIMES DAILY PRN
Qty: 90 TABLET | Refills: 0 | Status: SHIPPED | OUTPATIENT
Start: 2023-05-25 | End: 2023-06-24

## 2023-05-31 ENCOUNTER — PREP FOR PROCEDURE (OUTPATIENT)
Dept: NEUROSURGERY | Age: 58
End: 2023-05-31

## 2023-06-02 RX ORDER — SODIUM CHLORIDE 0.9 % (FLUSH) 0.9 %
5-40 SYRINGE (ML) INJECTION PRN
Status: CANCELLED | OUTPATIENT
Start: 2023-06-15

## 2023-06-02 RX ORDER — SODIUM CHLORIDE 9 MG/ML
INJECTION, SOLUTION INTRAVENOUS PRN
Status: CANCELLED | OUTPATIENT
Start: 2023-06-15

## 2023-06-02 RX ORDER — SODIUM CHLORIDE 0.9 % (FLUSH) 0.9 %
5-40 SYRINGE (ML) INJECTION EVERY 12 HOURS SCHEDULED
Status: CANCELLED | OUTPATIENT
Start: 2023-06-15

## 2023-06-02 RX ORDER — CEFAZOLIN SODIUM IN 0.9 % NACL 2 G/100 ML
2000 PLASTIC BAG, INJECTION (ML) INTRAVENOUS
Status: CANCELLED | OUTPATIENT
Start: 2023-06-15 | End: 2023-06-15

## 2023-06-02 RX ORDER — ACETAMINOPHEN 500 MG
1000 TABLET ORAL ONCE
Status: CANCELLED | OUTPATIENT
Start: 2023-06-15 | End: 2023-06-02

## 2023-06-05 DIAGNOSIS — I25.10 CORONARY ARTERY DISEASE INVOLVING NATIVE CORONARY ARTERY OF NATIVE HEART WITHOUT ANGINA PECTORIS: ICD-10-CM

## 2023-06-05 RX ORDER — CLOPIDOGREL BISULFATE 75 MG/1
TABLET ORAL
Qty: 150 TABLET | OUTPATIENT
Start: 2023-06-05

## 2023-06-08 ENCOUNTER — TELEPHONE (OUTPATIENT)
Dept: PAIN MANAGEMENT | Age: 58
End: 2023-06-08

## 2023-06-08 NOTE — TELEPHONE ENCOUNTER
She is having excruciating pain, hurts to try to walk or sit up and has her PAT appointment today at 1 p.m. doesn't know what to do. Can you order an X-ray to see if something is out of wack or something?

## 2023-06-15 ENCOUNTER — HOSPITAL ENCOUNTER (OUTPATIENT)
Age: 58
Setting detail: OUTPATIENT SURGERY
Discharge: HOME | End: 2023-06-15
Attending: PAIN MEDICINE | Admitting: PAIN MEDICINE
Payer: MEDICARE

## 2023-06-15 VITALS
HEIGHT: 62 IN | DIASTOLIC BLOOD PRESSURE: 60 MMHG | BODY MASS INDEX: 31.1 KG/M2 | RESPIRATION RATE: 16 BRPM | SYSTOLIC BLOOD PRESSURE: 124 MMHG | TEMPERATURE: 97 F | HEART RATE: 80 BPM | OXYGEN SATURATION: 99 % | WEIGHT: 169 LBS

## 2023-06-15 PROCEDURE — A4217 STERILE WATER/SALINE, 500 ML: HCPCS | Performed by: PAIN MEDICINE

## 2023-06-15 PROCEDURE — 6370000000 HC RX 637 (ALT 250 FOR IP): Performed by: PAIN MEDICINE

## 2023-06-15 PROCEDURE — 3700000001 HC ADD 15 MINUTES (ANESTHESIA): Performed by: PAIN MEDICINE

## 2023-06-15 PROCEDURE — 3600000004 HC SURGERY LEVEL 4 BASE: Performed by: PAIN MEDICINE

## 2023-06-15 PROCEDURE — 7100000010 HC PHASE II RECOVERY - FIRST 15 MIN: Performed by: PAIN MEDICINE

## 2023-06-15 PROCEDURE — C1755 CATHETER, INTRASPINAL: HCPCS | Performed by: PAIN MEDICINE

## 2023-06-15 PROCEDURE — A4216 STERILE WATER/SALINE, 10 ML: HCPCS | Performed by: PAIN MEDICINE

## 2023-06-15 PROCEDURE — 6360000002 HC RX W HCPCS: Performed by: PAIN MEDICINE

## 2023-06-15 PROCEDURE — 3600000014 HC SURGERY LEVEL 4 ADDTL 15MIN: Performed by: PAIN MEDICINE

## 2023-06-15 PROCEDURE — 3700000000 HC ANESTHESIA ATTENDED CARE: Performed by: PAIN MEDICINE

## 2023-06-15 PROCEDURE — 2720000010 HC SURG SUPPLY STERILE: Performed by: PAIN MEDICINE

## 2023-06-15 PROCEDURE — 2709999900 HC NON-CHARGEABLE SUPPLY: Performed by: PAIN MEDICINE

## 2023-06-15 PROCEDURE — 2580000003 HC RX 258: Performed by: PAIN MEDICINE

## 2023-06-15 PROCEDURE — 2500000003 HC RX 250 WO HCPCS: Performed by: PAIN MEDICINE

## 2023-06-15 PROCEDURE — 62350 IMPLANT SPINAL CANAL CATH: CPT | Performed by: PAIN MEDICINE

## 2023-06-15 PROCEDURE — C1772 INFUSION PUMP, PROGRAMMABLE: HCPCS | Performed by: PAIN MEDICINE

## 2023-06-15 PROCEDURE — C1751 CATH, INF, PER/CENT/MIDLINE: HCPCS | Performed by: PAIN MEDICINE

## 2023-06-15 PROCEDURE — 62362 IMPLANT SPINE INFUSION PUMP: CPT | Performed by: PAIN MEDICINE

## 2023-06-15 DEVICE — PUMP INFUS THK19.5MM DIA87.5MM 20ML TI PROGRAMMABLE: Type: IMPLANTABLE DEVICE | Site: ABDOMEN | Status: FUNCTIONAL

## 2023-06-15 DEVICE — CATHETER ITH L86CM ASCENDA SPNL SEG: Type: IMPLANTABLE DEVICE | Site: SPINE LUMBAR | Status: FUNCTIONAL

## 2023-06-15 RX ORDER — MORPHINE SULFATE 10 MG/ML
INJECTION, SOLUTION INTRAMUSCULAR; INTRAVENOUS PRN
Status: DISCONTINUED | OUTPATIENT
Start: 2023-06-15 | End: 2023-06-15 | Stop reason: ALTCHOICE

## 2023-06-15 RX ORDER — IPRATROPIUM BROMIDE AND ALBUTEROL SULFATE 2.5; .5 MG/3ML; MG/3ML
1 SOLUTION RESPIRATORY (INHALATION)
Status: DISCONTINUED | OUTPATIENT
Start: 2023-06-15 | End: 2023-06-15 | Stop reason: HOSPADM

## 2023-06-15 RX ORDER — SODIUM CHLORIDE 0.9 % (FLUSH) 0.9 %
5-40 SYRINGE (ML) INJECTION PRN
Status: DISCONTINUED | OUTPATIENT
Start: 2023-06-15 | End: 2023-06-15 | Stop reason: HOSPADM

## 2023-06-15 RX ORDER — SODIUM CHLORIDE 9 MG/ML
INJECTION, SOLUTION INTRAVENOUS PRN
Status: DISCONTINUED | OUTPATIENT
Start: 2023-06-15 | End: 2023-06-15 | Stop reason: HOSPADM

## 2023-06-15 RX ORDER — METOCLOPRAMIDE HYDROCHLORIDE 5 MG/ML
10 INJECTION INTRAMUSCULAR; INTRAVENOUS
Status: DISCONTINUED | OUTPATIENT
Start: 2023-06-15 | End: 2023-06-15 | Stop reason: HOSPADM

## 2023-06-15 RX ORDER — DEXTROSE MONOHYDRATE 100 MG/ML
INJECTION, SOLUTION INTRAVENOUS CONTINUOUS PRN
Status: DISCONTINUED | OUTPATIENT
Start: 2023-06-15 | End: 2023-06-15 | Stop reason: HOSPADM

## 2023-06-15 RX ORDER — MAGNESIUM HYDROXIDE 1200 MG/15ML
LIQUID ORAL CONTINUOUS PRN
Status: COMPLETED | OUTPATIENT
Start: 2023-06-15 | End: 2023-06-15

## 2023-06-15 RX ORDER — CEFAZOLIN SODIUM IN 0.9 % NACL 2 G/100 ML
2000 PLASTIC BAG, INJECTION (ML) INTRAVENOUS
Status: COMPLETED | OUTPATIENT
Start: 2023-06-15 | End: 2023-06-15

## 2023-06-15 RX ORDER — MEPERIDINE HYDROCHLORIDE 25 MG/ML
12.5 INJECTION INTRAMUSCULAR; INTRAVENOUS; SUBCUTANEOUS EVERY 5 MIN PRN
Status: DISCONTINUED | OUTPATIENT
Start: 2023-06-15 | End: 2023-06-15 | Stop reason: HOSPADM

## 2023-06-15 RX ORDER — LABETALOL HYDROCHLORIDE 5 MG/ML
10 INJECTION, SOLUTION INTRAVENOUS
Status: DISCONTINUED | OUTPATIENT
Start: 2023-06-15 | End: 2023-06-15 | Stop reason: HOSPADM

## 2023-06-15 RX ORDER — BUPIVACAINE HYDROCHLORIDE 5 MG/ML
INJECTION, SOLUTION EPIDURAL; INTRACAUDAL PRN
Status: DISCONTINUED | OUTPATIENT
Start: 2023-06-15 | End: 2023-06-15 | Stop reason: ALTCHOICE

## 2023-06-15 RX ORDER — HYDROMORPHONE HYDROCHLORIDE 1 MG/ML
0.5 INJECTION, SOLUTION INTRAMUSCULAR; INTRAVENOUS; SUBCUTANEOUS EVERY 5 MIN PRN
Status: DISCONTINUED | OUTPATIENT
Start: 2023-06-15 | End: 2023-06-15 | Stop reason: HOSPADM

## 2023-06-15 RX ORDER — ACETAMINOPHEN 500 MG
1000 TABLET ORAL ONCE
Status: COMPLETED | OUTPATIENT
Start: 2023-06-15 | End: 2023-06-15

## 2023-06-15 RX ORDER — ONDANSETRON 2 MG/ML
4 INJECTION INTRAMUSCULAR; INTRAVENOUS
Status: DISCONTINUED | OUTPATIENT
Start: 2023-06-15 | End: 2023-06-15 | Stop reason: HOSPADM

## 2023-06-15 RX ORDER — SODIUM CHLORIDE 0.9 % (FLUSH) 0.9 %
5-40 SYRINGE (ML) INJECTION EVERY 12 HOURS SCHEDULED
Status: DISCONTINUED | OUTPATIENT
Start: 2023-06-15 | End: 2023-06-15 | Stop reason: HOSPADM

## 2023-06-15 RX ORDER — GLUCAGON 1 MG/ML
1 KIT INJECTION PRN
Status: DISCONTINUED | OUTPATIENT
Start: 2023-06-15 | End: 2023-06-15 | Stop reason: HOSPADM

## 2023-06-15 RX ORDER — SODIUM CHLORIDE 9 MG/ML
INJECTION INTRAVENOUS PRN
Status: DISCONTINUED | OUTPATIENT
Start: 2023-06-15 | End: 2023-06-15 | Stop reason: ALTCHOICE

## 2023-06-15 RX ORDER — FENTANYL CITRATE 0.05 MG/ML
25 INJECTION, SOLUTION INTRAMUSCULAR; INTRAVENOUS EVERY 5 MIN PRN
Status: DISCONTINUED | OUTPATIENT
Start: 2023-06-15 | End: 2023-06-15 | Stop reason: HOSPADM

## 2023-06-15 RX ORDER — HYDRALAZINE HYDROCHLORIDE 20 MG/ML
10 INJECTION INTRAMUSCULAR; INTRAVENOUS
Status: DISCONTINUED | OUTPATIENT
Start: 2023-06-15 | End: 2023-06-15 | Stop reason: HOSPADM

## 2023-06-15 RX ORDER — LIDOCAINE HYDROCHLORIDE 10 MG/ML
INJECTION, SOLUTION EPIDURAL; INFILTRATION; INTRACAUDAL; PERINEURAL PRN
Status: DISCONTINUED | OUTPATIENT
Start: 2023-06-15 | End: 2023-06-15 | Stop reason: ALTCHOICE

## 2023-06-15 RX ADMIN — ACETAMINOPHEN 1000 MG: 500 TABLET ORAL at 09:05

## 2023-06-15 RX ADMIN — SODIUM CHLORIDE: 9 INJECTION, SOLUTION INTRAVENOUS at 09:03

## 2023-06-15 ASSESSMENT — PAIN - FUNCTIONAL ASSESSMENT: PAIN_FUNCTIONAL_ASSESSMENT: 0-10

## 2023-06-15 ASSESSMENT — PAIN SCALES - GENERAL: PAINLEVEL_OUTOF10: 0

## 2023-06-15 NOTE — OP NOTE
Operative Note      Patient: Jakob Peres  YOB: 1965  MRN: [id number]    Date of Procedure: 6/15/2023    Pre-Op Diagnosis Codes:     * Lumbar post-laminectomy syndrome [M96.1]    Post-Op Diagnosis: Same       Procedure(s):  PERMANENT INTRATHECAL PAIN PUMP INSERTION    Surgeon(s):  Av Patiño MD    Assistant:   Physician Assistant: Severa Few, PA-C    Anesthesia: General    Estimated Blood Loss (mL): less than 50     Complications: None    Specimens:   * No specimens in log *    Implants:  Implant Name Type Inv. Item Serial No.  Lot No. LRB No. Used Action   CATHETER ITH L86CM ASCENDA SPNL SEG - [id number]  CATHETER ITH L86CM ASCENDA SPNL SEG  [employer] [place] [employer] [id number] Right 1 Implanted   PUMP INFUS THK19.5MM DIA87.5MM 20ML TI PROGRAMMABLE - [id number]  PUMP INFUS THK19.5MM DIA87.5MM 20ML TI PROGRAMMABLE [id number] [employer] [place] [employer]  Right 1 Implanted         Drains: * No LDAs found *    Findings: none. Detailed Description of Procedure:   Pt explained of procedure, Discussed risks and Complications, Questions and concerns addressed, Taken to OR and placed left lateral on OR table, 2 Gm Kefsol IV for antibiotic profylaxis, Placed in Left lateral pojistion, Skin over Back and Right abdominal wall prepped with Chloraprep  draped, Fluroscope draped and got on to the field, Skin marked for needle placement with Fluroscope, Anesthetised with 1% Lidocaine Local, 16G tuhoy entered into spinal space at L1-2 confirmed by fluro above her fusion and return of CSF . a 20G catheter advanced at about [id number] with Fluroscopy , The stylet of needle removed, a small Pocket created at around the needle with sharp and Blunt dissection. An anchor was used to stabilise the catheter in place. The needle was now removed, A separate 2 inch incision was made in Rt lateral abdominal wall this was also converted into a pocket with Electrocautery and Blunt dissection, A  was used

## 2023-06-15 NOTE — INTERVAL H&P NOTE
Update History & Physical    The patient's History and Physical of Agueda 15, H&P was reviewed with the patient and I examined the patient. There was no change. The surgical site was confirmed by the patient and me. Plan: The risks, benefits, expected outcome, and alternative to the recommended procedure have been discussed with the patient. Patient understands and wants to proceed with the procedure.      Electronically signed by Spenser White MD on 6/15/2023 at 10:20 AM

## 2023-06-15 NOTE — DISCHARGE INSTRUCTIONS
Meds diet and activity as before, Stop Ibuprofen.  Follow up in 1 week or if you develop headachs sooner

## 2023-06-19 ENCOUNTER — TELEPHONE (OUTPATIENT)
Dept: NEUROSURGERY | Age: 58
End: 2023-06-19

## 2023-06-19 NOTE — TELEPHONE ENCOUNTER
SURG DATE:  6/15 - PERMANENT IT PUMP      PT CALLED @ 3:45PM STATING SHE DEVELOPED A HEADACHE ON Friday AND SHE STILL HAS IT ON THE LEFT SIDE OF HER HEAD. THE LEFT SIDE OF HER NECK IS ALSO BOTHERING HER. SHE IS THINKING THE HEADACHE IS BECAUSE OF THE PROCEDURE. SHE HAS A POST OP APPT ON 6/21 @ 1:00PM, BUT WANTS TO KNOW IF DR. TYLER WANTS TO SEE HER SOONER. PLEASE CALL HER @ [phone].

## 2023-06-21 ENCOUNTER — OFFICE VISIT (OUTPATIENT)
Dept: PAIN MANAGEMENT | Age: 58
End: 2023-06-21

## 2023-06-21 DIAGNOSIS — M96.1 LUMBAR POST-LAMINECTOMY SYNDROME: Primary | ICD-10-CM

## 2023-06-21 PROCEDURE — 99024 POSTOP FOLLOW-UP VISIT: CPT | Performed by: PAIN MEDICINE

## 2023-06-21 ASSESSMENT — ENCOUNTER SYMPTOMS
ALLERGIC/IMMUNOLOGIC NEGATIVE: 1
EYES NEGATIVE: 1
RESPIRATORY NEGATIVE: 1
GASTROINTESTINAL NEGATIVE: 1

## 2023-07-18 ENCOUNTER — OFFICE VISIT (OUTPATIENT)
Dept: PAIN MANAGEMENT | Age: 58
End: 2023-07-18
Payer: MEDICARE

## 2023-07-18 VITALS
BODY MASS INDEX: 32.98 KG/M2 | SYSTOLIC BLOOD PRESSURE: 132 MMHG | TEMPERATURE: 97.8 F | WEIGHT: 168 LBS | DIASTOLIC BLOOD PRESSURE: 88 MMHG | HEIGHT: 60 IN | HEART RATE: 110 BPM | OXYGEN SATURATION: 98 %

## 2023-07-18 DIAGNOSIS — M96.1 LUMBAR POST-LAMINECTOMY SYNDROME: Primary | ICD-10-CM

## 2023-07-18 DIAGNOSIS — M47.817 LUMBOSACRAL SPONDYLOSIS WITHOUT MYELOPATHY: ICD-10-CM

## 2023-07-18 PROCEDURE — 3075F SYST BP GE 130 - 139MM HG: CPT | Performed by: PAIN MEDICINE

## 2023-07-18 PROCEDURE — 4004F PT TOBACCO SCREEN RCVD TLK: CPT | Performed by: PAIN MEDICINE

## 2023-07-18 PROCEDURE — G8417 CALC BMI ABV UP PARAM F/U: HCPCS | Performed by: PAIN MEDICINE

## 2023-07-18 PROCEDURE — 3079F DIAST BP 80-89 MM HG: CPT | Performed by: PAIN MEDICINE

## 2023-07-18 PROCEDURE — 3017F COLORECTAL CA SCREEN DOC REV: CPT | Performed by: PAIN MEDICINE

## 2023-07-18 PROCEDURE — G8427 DOCREV CUR MEDS BY ELIG CLIN: HCPCS | Performed by: PAIN MEDICINE

## 2023-07-18 PROCEDURE — 99213 OFFICE O/P EST LOW 20 MIN: CPT | Performed by: PAIN MEDICINE

## 2023-07-18 ASSESSMENT — ENCOUNTER SYMPTOMS
GASTROINTESTINAL NEGATIVE: 1
EYES NEGATIVE: 1
ALLERGIC/IMMUNOLOGIC NEGATIVE: 1
RESPIRATORY NEGATIVE: 1

## 2023-07-18 NOTE — PROGRESS NOTES
Nancy Pagan (:  1965) is a 62 y.o. female,Established patient, here for evaluation of the following chief complaint(s):  Follow-up and Back Pain         ASSESSMENT/PLAN:  1. Lumbar post-laminectomy syndrome  2. Lumbosacral spondylosis without myelopathy     Patient presents for Follow up   of IT morphine pump with complaint of back pain. 70% Relief of pain with some itching. Good wound healing, No head ach, Increased her infusion from 0.1 to 0.15mg a day. Alarm date 10/20/2023. Subjective   SUBJECTIVE/OBJECTIVE:  Patient presents with complaint of back pain. Mostly in the back with occational Radiation, Had Epidurals, Laminotomies with no Relief. MRI shows Pedicle screws inserted at the L3, L4 and L5 levels bilaterally. Intervertebral spacers have been inserted at L3-4 and L4-5 levels and cause artifacts. No abnormal sites of enhancement. Last PT did not help and made it worse. Was on narcs and had a suicidal attempt with them in the past, On Plavix since 3 years for a cardiac stent,  Plan was for a stim trial that did not help    Follow up  of IT morphine pump with complaint of back pain. 70% Relief of pain with some itching. Review of Systems   Constitutional: Negative. HENT: Negative. Eyes: Negative. Respiratory: Negative. H/o COPD   Cardiovascular: Negative. Single stent 3 years ago   Gastrointestinal: Negative. Endocrine: Negative. Genitourinary: Negative. Musculoskeletal: Negative. Skin: Negative. Allergic/Immunologic: Negative. Neurological: Negative. Hematological: Negative. Psychiatric/Behavioral: Negative. All other systems reviewed and are negative. Objective   Physical Exam  Constitutional:       Appearance: Normal appearance. She is normal weight. HENT:      Head: Normocephalic and atraumatic.       Nose: Nose normal.      Mouth/Throat:      Mouth: Mucous membranes are moist.   Eyes:      Extraocular Movements:

## 2023-08-02 ENCOUNTER — OFFICE VISIT (OUTPATIENT)
Dept: PAIN MANAGEMENT | Age: 58
End: 2023-08-02
Payer: MEDICARE

## 2023-08-02 VITALS
WEIGHT: 168 LBS | SYSTOLIC BLOOD PRESSURE: 120 MMHG | DIASTOLIC BLOOD PRESSURE: 76 MMHG | TEMPERATURE: 97 F | BODY MASS INDEX: 32.98 KG/M2 | HEIGHT: 60 IN

## 2023-08-02 DIAGNOSIS — M96.1 LUMBAR POST-LAMINECTOMY SYNDROME: Primary | ICD-10-CM

## 2023-08-02 PROCEDURE — 4004F PT TOBACCO SCREEN RCVD TLK: CPT | Performed by: PAIN MEDICINE

## 2023-08-02 PROCEDURE — 3017F COLORECTAL CA SCREEN DOC REV: CPT | Performed by: PAIN MEDICINE

## 2023-08-02 PROCEDURE — 3074F SYST BP LT 130 MM HG: CPT | Performed by: PAIN MEDICINE

## 2023-08-02 PROCEDURE — 99213 OFFICE O/P EST LOW 20 MIN: CPT | Performed by: PAIN MEDICINE

## 2023-08-02 PROCEDURE — G8417 CALC BMI ABV UP PARAM F/U: HCPCS | Performed by: PAIN MEDICINE

## 2023-08-02 PROCEDURE — 3078F DIAST BP <80 MM HG: CPT | Performed by: PAIN MEDICINE

## 2023-08-02 PROCEDURE — G8427 DOCREV CUR MEDS BY ELIG CLIN: HCPCS | Performed by: PAIN MEDICINE

## 2023-08-02 ASSESSMENT — ENCOUNTER SYMPTOMS
RESPIRATORY NEGATIVE: 1
EYES NEGATIVE: 1
ALLERGIC/IMMUNOLOGIC NEGATIVE: 1
GASTROINTESTINAL NEGATIVE: 1

## 2023-08-02 NOTE — PROGRESS NOTES
Estevan Barbosa (:  1965) is a 62 y.o. female,Established patient, here for evaluation of the following chief complaint(s):  Back Pain         ASSESSMENT/PLAN:  1. Lumbar post-laminectomy syndrome     Patient presents for Follow up   of IT morphine pump with complaint of back pain. 70% Relief of pain with some itching but now having more pain since she started working. . Good wound healing, No head ach, Increased her infusion from 0.15 to 0.2mg a day. Alarm date 10/20/2023. Subjective   SUBJECTIVE/OBJECTIVE:  Patient presents with complaint of back pain. Mostly in the back with occational Radiation, Had Epidurals, Laminotomies with no Relief. MRI shows Pedicle screws inserted at the L3, L4 and L5 levels bilaterally. Intervertebral spacers have been inserted at L3-4 and L4-5 levels and cause artifacts. No abnormal sites of enhancement. Last PT did not help and made it worse. Was on narcs and had a suicidal attempt with them in the past, On Plavix since 3 years for a cardiac stent,  Plan was for a stim trial that did not help    Follow up  of IT morphine pump with complaint of back pain. 70% Relief of pain with some itching. Review of Systems   Constitutional: Negative. HENT: Negative. Eyes: Negative. Respiratory: Negative. H/o COPD   Cardiovascular: Negative. Single stent 3 years ago   Gastrointestinal: Negative. Endocrine: Negative. Genitourinary: Negative. Musculoskeletal: Negative. Skin: Negative. Allergic/Immunologic: Negative. Neurological: Negative. Hematological: Negative. Psychiatric/Behavioral: Negative. All other systems reviewed and are negative. Objective   Physical Exam  Constitutional:       Appearance: Normal appearance. She is normal weight. HENT:      Head: Normocephalic and atraumatic.       Nose: Nose normal.      Mouth/Throat:      Mouth: Mucous membranes are moist.   Eyes:      Extraocular Movements: Extraocular

## 2023-09-06 ENCOUNTER — TELEPHONE (OUTPATIENT)
Dept: PAIN MANAGEMENT | Age: 58
End: 2023-09-06

## 2023-09-11 NOTE — TELEPHONE ENCOUNTER
RECEIVED MEDICATION FOR PUMP REFILL. MEDICATION IS IN THE POST-OP ROOM IN THE LEFT CABINET.      MEDICATION USE BY DATE: 10/06/2023

## 2023-09-13 ENCOUNTER — PROCEDURE VISIT (OUTPATIENT)
Dept: PAIN MANAGEMENT | Age: 58
End: 2023-09-13

## 2023-09-13 VITALS
DIASTOLIC BLOOD PRESSURE: 78 MMHG | BODY MASS INDEX: 31.41 KG/M2 | SYSTOLIC BLOOD PRESSURE: 136 MMHG | WEIGHT: 160 LBS | HEIGHT: 60 IN | TEMPERATURE: 97.2 F

## 2023-09-13 DIAGNOSIS — M96.1 LUMBAR POST-LAMINECTOMY SYNDROME: Primary | ICD-10-CM

## 2023-09-13 ASSESSMENT — ENCOUNTER SYMPTOMS
EYES NEGATIVE: 1
ALLERGIC/IMMUNOLOGIC NEGATIVE: 1
RESPIRATORY NEGATIVE: 1
GASTROINTESTINAL NEGATIVE: 1

## 2023-09-13 NOTE — PROGRESS NOTES
Ana Noel (:  1965) is a 62 y.o. female,Established patient, here for evaluation of the following chief complaint(s):  Back Pain         ASSESSMENT/PLAN:  1. Lumbar post-laminectomy syndrome  -     [id number] PRG/REF DR; Future     Patient presents for Refill  of IT morphine pump with complaint of back pain. good Relief of pain Able to get back to work force. Good wound healing, No head ach,    Pump Refill: Discussed Procedure, Pump accessed with , Changed volume to 20ccs,   Site cleansed with Betadine, Port accessed with 25G needle, With mikey 5cc fluid refilled with 20cc of Morphine 1 mg per cc and Bupivacaine 1mg per cc, Current rate 0.2mg a day alarm date is 23,     Subjective   SUBJECTIVE/OBJECTIVE:  Patient presents with complaint of back pain. Mostly in the back with occational Radiation, Had Epidurals, Laminotomies with no Relief. MRI shows Pedicle screws inserted at the L3, L4 and L5 levels bilaterally. Intervertebral spacers have been inserted at L3-4 and L4-5 levels and cause artifacts. No abnormal sites of enhancement. Last PT did not help and made it worse. Was on narcs and had a suicidal attempt with them in the past, On Plavix since 3 years for a cardiac stent,  Review of Systems   Constitutional: Negative. HENT: Negative. Eyes: Negative. Respiratory: Negative. H/o COPD   Cardiovascular: Negative. Single stent 3 years ago   Gastrointestinal: Negative. Endocrine: Negative. Genitourinary: Negative. Musculoskeletal: Negative. Skin: Negative. Allergic/Immunologic: Negative. Neurological: Negative. Hematological: Negative. Psychiatric/Behavioral: Negative. All other systems reviewed and are negative. Objective   Physical Exam  Constitutional:       Appearance: Normal appearance. She is normal weight. HENT:      Head: Normocephalic and atraumatic.       Nose: Nose normal.      Mouth/Throat:      Mouth:

## 2023-11-10 ENCOUNTER — TELEPHONE (OUTPATIENT)
Dept: NEUROSURGERY | Age: 58
End: 2023-11-10

## 2023-11-10 DIAGNOSIS — I25.10 ARTERIOSCLEROSIS OF CORONARY ARTERY IN PATIENT WITH HISTORY OF MYOCARDIAL INFARCTION: ICD-10-CM

## 2023-11-10 DIAGNOSIS — I25.2 ARTERIOSCLEROSIS OF CORONARY ARTERY IN PATIENT WITH HISTORY OF MYOCARDIAL INFARCTION: ICD-10-CM

## 2023-11-10 PROBLEM — R07.89 ATYPICAL CHEST PAIN: Status: ACTIVE | Noted: 2023-11-10

## 2023-11-10 PROBLEM — M54.16 LUMBAR RADICULITIS: Status: ACTIVE | Noted: 2023-11-10

## 2023-11-10 PROBLEM — M54.50 LOW BACK PAIN: Status: ACTIVE | Noted: 2023-11-10

## 2023-11-10 PROBLEM — F32.A DEPRESSION: Status: ACTIVE | Noted: 2023-11-10

## 2023-11-10 PROBLEM — Q61.3 POLYCYSTIC KIDNEY: Status: ACTIVE | Noted: 2023-11-10

## 2023-11-10 PROBLEM — J45.909 ASTHMA, MILD (HHS-HCC): Status: ACTIVE | Noted: 2023-11-10

## 2023-11-10 PROBLEM — R11.0 NAUSEA IN ADULT: Status: ACTIVE | Noted: 2023-11-10

## 2023-11-10 PROBLEM — M47.892 OTHER SPONDYLOSIS, CERVICAL REGION: Status: ACTIVE | Noted: 2023-11-10

## 2023-11-10 PROBLEM — F17.200 CURRENT EVERY DAY SMOKER: Status: ACTIVE | Noted: 2023-11-10

## 2023-11-10 PROBLEM — M47.814 THORACIC SPONDYLOSIS: Status: ACTIVE | Noted: 2023-11-10

## 2023-11-10 PROBLEM — R00.0 TACHYCARDIA: Status: ACTIVE | Noted: 2023-11-10

## 2023-11-10 PROBLEM — G89.4 CHRONIC PAIN SYNDROME: Status: ACTIVE | Noted: 2023-11-10

## 2023-11-10 PROBLEM — F41.9 ANXIETY: Status: ACTIVE | Noted: 2023-11-10

## 2023-11-10 PROBLEM — I10 HTN (HYPERTENSION), BENIGN: Status: ACTIVE | Noted: 2023-11-10

## 2023-11-10 PROBLEM — M51.36 DEGENERATION OF INTERVERTEBRAL DISC OF LUMBAR REGION: Status: ACTIVE | Noted: 2023-11-10

## 2023-11-10 PROBLEM — E78.5 HYPERLIPIDEMIA: Status: ACTIVE | Noted: 2023-11-10

## 2023-11-10 PROBLEM — Z98.61 S/P PTCA (PERCUTANEOUS TRANSLUMINAL CORONARY ANGIOPLASTY): Status: ACTIVE | Noted: 2023-11-10

## 2023-11-10 PROBLEM — M54.16 LUMBAR RADICULOPATHY: Status: ACTIVE | Noted: 2023-11-10

## 2023-11-10 PROBLEM — R06.83 SNORES: Status: ACTIVE | Noted: 2023-11-10

## 2023-11-10 PROBLEM — E03.9 ACQUIRED HYPOTHYROIDISM: Status: ACTIVE | Noted: 2023-11-10

## 2023-11-10 PROBLEM — M47.816 LUMBAR SPONDYLOSIS: Status: ACTIVE | Noted: 2023-11-10

## 2023-11-10 PROBLEM — R40.0 DAYTIME SOMNOLENCE: Status: ACTIVE | Noted: 2023-11-10

## 2023-11-10 PROBLEM — T40.604S: Status: ACTIVE | Noted: 2023-11-10

## 2023-11-10 PROBLEM — M54.2 NECK PAIN: Status: ACTIVE | Noted: 2023-11-10

## 2023-11-10 PROBLEM — D64.9 ANEMIA: Status: ACTIVE | Noted: 2023-11-10

## 2023-11-10 PROBLEM — E55.9 VITAMIN D DEFICIENCY: Status: ACTIVE | Noted: 2023-11-10

## 2023-11-10 PROBLEM — J44.9 COPD, MODERATE (MULTI): Status: ACTIVE | Noted: 2023-11-10

## 2023-11-10 PROBLEM — M51.34 DEGENERATION OF INTERVERTEBRAL DISC OF THORACIC REGION: Status: ACTIVE | Noted: 2023-11-10

## 2023-11-10 PROBLEM — N28.9 KIDNEY DISORDER: Status: ACTIVE | Noted: 2023-11-10

## 2023-11-10 PROBLEM — F45.42 PAIN DISORDER ASSOCIATED WITH PSYCHOLOGICAL AND PHYSICAL FACTORS: Status: ACTIVE | Noted: 2023-11-10

## 2023-11-10 PROBLEM — M50.30 DEGENERATION OF INTERVERTEBRAL DISC OF CERVICAL REGION: Status: ACTIVE | Noted: 2023-11-10

## 2023-11-10 PROBLEM — M96.1 FAILED BACK SURGICAL SYNDROME: Status: ACTIVE | Noted: 2023-11-10

## 2023-11-10 PROBLEM — M51.369 DEGENERATION OF INTERVERTEBRAL DISC OF LUMBAR REGION: Status: ACTIVE | Noted: 2023-11-10

## 2023-11-10 RX ORDER — LEVOTHYROXINE SODIUM 25 UG/1
TABLET ORAL
COMMUNITY
End: 2024-01-05 | Stop reason: SDUPTHER

## 2023-11-10 RX ORDER — METOPROLOL SUCCINATE 50 MG/1
1 TABLET, EXTENDED RELEASE ORAL DAILY
COMMUNITY
Start: 2022-08-25 | End: 2023-11-10 | Stop reason: SDUPTHER

## 2023-11-10 RX ORDER — ATORVASTATIN CALCIUM 80 MG/1
1 TABLET, FILM COATED ORAL DAILY
COMMUNITY
End: 2024-02-08 | Stop reason: SDUPTHER

## 2023-11-10 RX ORDER — CLOPIDOGREL BISULFATE 75 MG/1
1 TABLET ORAL DAILY
COMMUNITY
Start: 2019-09-25 | End: 2023-11-10 | Stop reason: SDUPTHER

## 2023-11-10 RX ORDER — CLOPIDOGREL BISULFATE 75 MG/1
75 TABLET ORAL DAILY
Qty: 30 TABLET | Refills: 0 | Status: SHIPPED | OUTPATIENT
Start: 2023-11-10 | End: 2023-12-10

## 2023-11-10 RX ORDER — METOPROLOL SUCCINATE 50 MG/1
50 TABLET, EXTENDED RELEASE ORAL DAILY
Qty: 30 TABLET | Refills: 0 | Status: SHIPPED | OUTPATIENT
Start: 2023-11-10 | End: 2024-01-18 | Stop reason: SDUPTHER

## 2023-11-10 RX ORDER — FLUOXETINE HYDROCHLORIDE 20 MG/1
1 CAPSULE ORAL
COMMUNITY
Start: 2022-07-15 | End: 2024-02-08 | Stop reason: WASHOUT

## 2023-11-10 RX ORDER — OMEPRAZOLE 40 MG/1
1 CAPSULE, DELAYED RELEASE ORAL DAILY
COMMUNITY

## 2023-11-10 RX ORDER — ONDANSETRON 4 MG/1
TABLET, FILM COATED ORAL
COMMUNITY
Start: 2022-07-15 | End: 2024-02-08 | Stop reason: WASHOUT

## 2023-11-10 RX ORDER — NITROGLYCERIN 0.4 MG/1
TABLET SUBLINGUAL
COMMUNITY

## 2023-11-10 RX ORDER — ALBUTEROL SULFATE 90 UG/1
AEROSOL, METERED RESPIRATORY (INHALATION)
COMMUNITY
Start: 2021-10-12

## 2023-11-10 RX ORDER — CARISOPRODOL 350 MG/1
1 TABLET ORAL 3 TIMES DAILY PRN
COMMUNITY
Start: 2022-08-11 | End: 2024-02-08 | Stop reason: WASHOUT

## 2023-11-10 NOTE — TELEPHONE ENCOUNTER
Patient called requesting increase in pain medication for pain pump at next appt. Next appt.  Is 12/8/23

## 2023-11-29 ENCOUNTER — OFFICE VISIT (OUTPATIENT)
Dept: PAIN MANAGEMENT | Age: 58
End: 2023-11-29
Payer: COMMERCIAL

## 2023-11-29 VITALS
TEMPERATURE: 97.7 F | HEIGHT: 60 IN | DIASTOLIC BLOOD PRESSURE: 60 MMHG | WEIGHT: 152 LBS | BODY MASS INDEX: 29.84 KG/M2 | SYSTOLIC BLOOD PRESSURE: 120 MMHG

## 2023-11-29 DIAGNOSIS — M96.1 LUMBAR POST-LAMINECTOMY SYNDROME: Primary | ICD-10-CM

## 2023-11-29 PROCEDURE — 99213 OFFICE O/P EST LOW 20 MIN: CPT | Performed by: PAIN MEDICINE

## 2023-11-29 PROCEDURE — 3074F SYST BP LT 130 MM HG: CPT | Performed by: PAIN MEDICINE

## 2023-11-29 PROCEDURE — 3078F DIAST BP <80 MM HG: CPT | Performed by: PAIN MEDICINE

## 2023-11-29 ASSESSMENT — ENCOUNTER SYMPTOMS
EYES NEGATIVE: 1
RESPIRATORY NEGATIVE: 1
ALLERGIC/IMMUNOLOGIC NEGATIVE: 1
GASTROINTESTINAL NEGATIVE: 1

## 2023-11-29 NOTE — PROGRESS NOTES
Jacques Desir (:  1965) is a 62 y.o. female,Established patient, here for evaluation of the following chief complaint(s):  Follow-up, Back Pain (lower), and Knee Pain         ASSESSMENT/PLAN:  1. Lumbar post-laminectomy syndrome   Patient presents for Pain Rt low back has IT morphine pump  good Relief of pain that is slowly getting less, Able to get back to work force. Pump Refill: Discussed Procedure, Pump accessed with , reprogrammed rate to 0.25mg refill date is 23    Subjective   SUBJECTIVE/OBJECTIVE:  Patient presents with complaint of back pain. Mostly in the back with occational Radiation, Had Epidurals, Laminotomies with no Relief. MRI shows Pedicle screws inserted at the L3, L4 and L5 levels bilaterally. Intervertebral spacers have been inserted at L3-4 and L4-5 levels and cause artifacts. No abnormal sites of enhancement. Last PT did not help and made it worse. Was on narcs and had a suicidal attempt with them in the past, On Plavix since 3 years for a cardiac stent,  Review of Systems   Constitutional: Negative. HENT: Negative. Eyes: Negative. Respiratory: Negative. H/o COPD   Cardiovascular: Negative. Single stent 3 years ago   Gastrointestinal: Negative. Endocrine: Negative. Genitourinary: Negative. Musculoskeletal: Negative. Skin: Negative. Allergic/Immunologic: Negative. Neurological: Negative. Hematological: Negative. Psychiatric/Behavioral: Negative. All other systems reviewed and are negative. Objective   Physical Exam  Constitutional:       Appearance: Normal appearance. She is normal weight. HENT:      Head: Normocephalic and atraumatic. Nose: Nose normal.      Mouth/Throat:      Mouth: Mucous membranes are moist.   Eyes:      Extraocular Movements: Extraocular movements intact. Conjunctiva/sclera: Conjunctivae normal.      Pupils: Pupils are equal, round, and reactive to light.

## 2023-11-30 ENCOUNTER — TELEPHONE (OUTPATIENT)
Dept: PAIN MANAGEMENT | Age: 58
End: 2023-11-30

## 2023-11-30 NOTE — TELEPHONE ENCOUNTER
LMOM ASKING PATIENT TO CALL THE OFFICE TO CONFIRM PAIN PUMP REFILL ON 12/18/2023 AT 10:00 AM. PER DR TYLER, MEDICATION CAN NOT BE ORDERED UNTIL WE HAVE VERBAL CONFIRMATION FROM THE PATIENT FOR APPOINTMENT.

## 2023-12-04 ENCOUNTER — TELEPHONE (OUTPATIENT)
Dept: PAIN MANAGEMENT | Age: 58
End: 2023-12-04

## 2023-12-04 NOTE — TELEPHONE ENCOUNTER
CREATED AND FAXED PUMP REFILL ORDER TO [facility] ORDER ID: [id number]      EXPECTED DELIVERY DATE:  12/07/2023     REFILL DATE: 12/08/2023     HOME CONNECT: NO    MEDICATION ORDERED/CONCENTRATION/UNIT    PF Morphine Sulfate 1 mg/mL  PF Bupivacaine HCl 1 mg/mL    TOTAL VOLUME 20 (twenty) mL     [facility] ORDER ATTACHED TO THIS ENCOUNTER.

## 2023-12-04 NOTE — TELEPHONE ENCOUNTER
PT HAS MADE A  UROLOGY APPT FOR 4/10 AT 11:00AM TO GET CLEARANCE. STILL HOPING TO GO FOR SURGERY ON 4/11 IF SHE IS CLEARED. are being furnished while the patient is under my care. I agree with the treatment plan and certify that this therapy is necessary. Insurance: Payor: Avelino Mckee / Plan: Avelino Martinezor / Product Type: *No Product type* /      ** Signature, Date and Time must be completed for valid certification **  [facility] or you may fax the signed copy to [phone]. Thank you.

## 2023-12-08 ENCOUNTER — PROCEDURE VISIT (OUTPATIENT)
Dept: PAIN MANAGEMENT | Age: 58
End: 2023-12-08

## 2023-12-08 DIAGNOSIS — M51.36 DEGENERATION OF LUMBAR INTERVERTEBRAL DISC: ICD-10-CM

## 2023-12-08 DIAGNOSIS — M47.817 LUMBOSACRAL SPONDYLOSIS WITHOUT MYELOPATHY: ICD-10-CM

## 2023-12-08 DIAGNOSIS — M96.1 LUMBAR POST-LAMINECTOMY SYNDROME: Primary | ICD-10-CM

## 2023-12-08 NOTE — PROGRESS NOTES
Melissa Islas (:  1965) is a 62 y.o. female,Established patient, here for evaluation of the following chief complaint(s):  Back Pain         ASSESSMENT/PLAN:  1. Lumbar post-laminectomy syndrome  -     [id number]-[id number] PRG/REF DR; Future  2. Lumbosacral spondylosis without myelopathy  -     78485-[id number] PRG/REF DR; Future  3. Degeneration of lumbar intervertebral disc  -     55161-Sqzaktb PRG/REF DR; Future     Patient presents for Refill  of IT morphine pump with complaint of back pain. good Relief of pain Able to get back to work force. Good wound healing, No head ach,    Pump Refill: Discussed Procedure, Pump accessed with , Changed volume to 20ccs,   Site cleansed with Betadine, Port accessed with 25G needle, With mikey 5cc fluid refilled with 20cc of Morphine 1 mg per cc and Bupivacaine 1mg per cc, Current rate 0.252/17/24mg a day alarm date is 23,     Subjective   SUBJECTIVE/OBJECTIVE:  Patient presents with complaint of back pain. Mostly in the back with occational Radiation, Had Epidurals, Laminotomies with no Relief. MRI shows Pedicle screws inserted at the L3, L4 and L5 levels bilaterally. Intervertebral spacers have been inserted at L3-4 and L4-5 levels and cause artifacts. No abnormal sites of enhancement. Last PT did not help and made it worse. Was on narcs and had a suicidal attempt with them in the past, On Plavix since 3 years for a cardiac stent,  Review of Systems   Constitutional: Negative. HENT: Negative. Eyes: Negative. Respiratory: Negative. H/o COPD   Cardiovascular: Negative. Single stent 3 years ago   Gastrointestinal: Negative. Endocrine: Negative. Genitourinary: Negative. Musculoskeletal: Negative. Skin: Negative. Allergic/Immunologic: Negative. Neurological: Negative. Hematological: Negative. Psychiatric/Behavioral: Negative. All other systems reviewed and are negative.          Objective   Physical

## 2024-01-04 DIAGNOSIS — I25.10 ARTERIOSCLEROSIS OF CORONARY ARTERY IN PATIENT WITH HISTORY OF MYOCARDIAL INFARCTION: ICD-10-CM

## 2024-01-04 DIAGNOSIS — I25.2 ARTERIOSCLEROSIS OF CORONARY ARTERY IN PATIENT WITH HISTORY OF MYOCARDIAL INFARCTION: ICD-10-CM

## 2024-01-04 DIAGNOSIS — E03.9 HYPOTHYROIDISM, UNSPECIFIED TYPE: ICD-10-CM

## 2024-01-04 RX ORDER — CLOPIDOGREL BISULFATE 75 MG/1
TABLET ORAL
COMMUNITY
Start: 2020-10-08 | End: 2024-01-04 | Stop reason: SDUPTHER

## 2024-01-04 RX ORDER — CLOPIDOGREL BISULFATE 75 MG/1
75 TABLET ORAL DAILY
Qty: 30 TABLET | Refills: 0 | Status: SHIPPED | OUTPATIENT
Start: 2024-01-04 | End: 2024-02-08 | Stop reason: SDUPTHER

## 2024-01-04 RX ORDER — BUDESONIDE AND FORMOTEROL FUMARATE DIHYDRATE 160; 4.5 UG/1; UG/1
AEROSOL RESPIRATORY (INHALATION)
COMMUNITY
Start: 2023-12-12

## 2024-01-04 NOTE — TELEPHONE ENCOUNTER
Pt left message 1/3/2024 at 447 pm asking if Dr. Villasenor would give her a temporary supply of her Levothyroxine 25 mcg until she get established with a new PCP.  Pt would like Rx sent to [facility].    Pt can be reached at [phone] or at [phone].    Routed to Alvaro PRESLEY RN.

## 2024-01-05 RX ORDER — LEVOTHYROXINE SODIUM 25 UG/1
TABLET ORAL
Qty: 90 TABLET | Refills: 0 | Status: SHIPPED | OUTPATIENT
Start: 2024-01-05 | End: 2024-04-19 | Stop reason: SDUPTHER

## 2024-01-05 NOTE — TELEPHONE ENCOUNTER
Will abstract chart in this encounter, then pend 90 day supply for patient as courtesy.     Pended and sent to Dr. Que Yu MD  for signing.

## 2024-01-08 PROBLEM — Z86.79 HX OF CORONARY ARTERY DISEASE: Status: ACTIVE | Noted: 2022-07-15

## 2024-01-08 PROBLEM — N18.30 CHRONIC RENAL DISEASE, STAGE III (MULTI): Status: ACTIVE | Noted: 2022-07-15

## 2024-01-08 PROBLEM — Z95.5 STENTED CORONARY ARTERY: Status: ACTIVE | Noted: 2024-01-08

## 2024-01-08 PROBLEM — I21.9 MYOCARDIAL INFARCTION (MULTI): Status: ACTIVE | Noted: 2022-07-15

## 2024-01-08 PROBLEM — I25.10 CORONARY ARTERY DISEASE INVOLVING NATIVE HEART WITHOUT ANGINA PECTORIS: Status: ACTIVE | Noted: 2018-11-25

## 2024-01-11 ENCOUNTER — APPOINTMENT (OUTPATIENT)
Dept: CARDIOLOGY | Facility: CLINIC | Age: 59
End: 2024-01-11
Payer: COMMERCIAL

## 2024-01-18 DIAGNOSIS — I25.2 ARTERIOSCLEROSIS OF CORONARY ARTERY IN PATIENT WITH HISTORY OF MYOCARDIAL INFARCTION: ICD-10-CM

## 2024-01-18 DIAGNOSIS — I25.10 ARTERIOSCLEROSIS OF CORONARY ARTERY IN PATIENT WITH HISTORY OF MYOCARDIAL INFARCTION: ICD-10-CM

## 2024-01-18 RX ORDER — METOPROLOL SUCCINATE 50 MG/1
50 TABLET, EXTENDED RELEASE ORAL DAILY
Qty: 30 TABLET | Refills: 0 | Status: SHIPPED | OUTPATIENT
Start: 2024-01-18 | End: 2024-02-08 | Stop reason: SDUPTHER

## 2024-02-02 ENCOUNTER — APPOINTMENT (OUTPATIENT)
Dept: CARDIOLOGY | Facility: CLINIC | Age: 59
End: 2024-02-02
Payer: COMMERCIAL

## 2024-02-05 ENCOUNTER — TELEPHONE (OUTPATIENT)
Dept: PAIN MANAGEMENT | Age: 59
End: 2024-02-05

## 2024-02-05 NOTE — TELEPHONE ENCOUNTER
CREATED AND FAXED PUMP REFILL ORDER TO [facility]          ORDER ID: [id number]     EXPECTED DELIVERY DATE:  02/07/2024     REFILL DATE: 2/8/2024    CHANGE FROM PREVIOUS ORDER: YES    HOME CONNECT: NO    MEDICATION ORDERED/CONCENTRATION/UNIT    PF Morphine Sulfate 2 mg/mL  PF Bupivacaine HCl 2 mg/mL    TOTAL VOLUME 20 (twenty) mL     [facility] ORDER ATTACHED TO THIS ENCOUNTER.

## 2024-02-08 ENCOUNTER — OFFICE VISIT (OUTPATIENT)
Dept: CARDIOLOGY | Facility: CLINIC | Age: 59
End: 2024-02-08
Payer: COMMERCIAL

## 2024-02-08 ENCOUNTER — LAB (OUTPATIENT)
Dept: LAB | Facility: LAB | Age: 59
End: 2024-02-08
Payer: COMMERCIAL

## 2024-02-08 ENCOUNTER — PROCEDURE VISIT (OUTPATIENT)
Dept: PAIN MANAGEMENT | Age: 59
End: 2024-02-08

## 2024-02-08 VITALS
DIASTOLIC BLOOD PRESSURE: 76 MMHG | WEIGHT: 149 LBS | HEART RATE: 84 BPM | BODY MASS INDEX: 28.13 KG/M2 | HEIGHT: 61 IN | SYSTOLIC BLOOD PRESSURE: 126 MMHG

## 2024-02-08 DIAGNOSIS — I25.10 CAD S/P PERCUTANEOUS CORONARY ANGIOPLASTY: ICD-10-CM

## 2024-02-08 DIAGNOSIS — I25.2 ARTERIOSCLEROSIS OF CORONARY ARTERY IN PATIENT WITH HISTORY OF MYOCARDIAL INFARCTION: ICD-10-CM

## 2024-02-08 DIAGNOSIS — I25.10 ARTERIOSCLEROSIS OF CORONARY ARTERY IN PATIENT WITH HISTORY OF MYOCARDIAL INFARCTION: ICD-10-CM

## 2024-02-08 DIAGNOSIS — Z98.61 CAD S/P PERCUTANEOUS CORONARY ANGIOPLASTY: ICD-10-CM

## 2024-02-08 DIAGNOSIS — F17.200 CURRENT SMOKER: ICD-10-CM

## 2024-02-08 DIAGNOSIS — E78.2 MIXED HYPERLIPIDEMIA: ICD-10-CM

## 2024-02-08 DIAGNOSIS — M96.1 LUMBAR POST-LAMINECTOMY SYNDROME: Primary | ICD-10-CM

## 2024-02-08 DIAGNOSIS — E03.9 ACQUIRED HYPOTHYROIDISM: ICD-10-CM

## 2024-02-08 DIAGNOSIS — I10 HTN (HYPERTENSION), BENIGN: ICD-10-CM

## 2024-02-08 PROBLEM — Z86.79 HX OF CORONARY ARTERY DISEASE: Status: RESOLVED | Noted: 2022-07-15 | Resolved: 2024-02-08

## 2024-02-08 PROBLEM — Z95.5 STENTED CORONARY ARTERY: Status: RESOLVED | Noted: 2024-01-08 | Resolved: 2024-02-08

## 2024-02-08 LAB
CHOLEST SERPL-MCNC: 241 MG/DL (ref 0–199)
CHOLESTEROL/HDL RATIO: 3.8
HDLC SERPL-MCNC: 62.7 MG/DL
LDLC SERPL CALC-MCNC: ABNORMAL MG/DL
NON HDL CHOLESTEROL: 178 MG/DL (ref 0–149)
TRIGL SERPL-MCNC: 438 MG/DL (ref 0–149)
VLDL: ABNORMAL

## 2024-02-08 PROCEDURE — 3074F SYST BP LT 130 MM HG: CPT | Performed by: INTERNAL MEDICINE

## 2024-02-08 PROCEDURE — 3078F DIAST BP <80 MM HG: CPT | Performed by: INTERNAL MEDICINE

## 2024-02-08 PROCEDURE — 3008F BODY MASS INDEX DOCD: CPT | Performed by: INTERNAL MEDICINE

## 2024-02-08 PROCEDURE — 36415 COLL VENOUS BLD VENIPUNCTURE: CPT

## 2024-02-08 PROCEDURE — 99214 OFFICE O/P EST MOD 30 MIN: CPT | Performed by: INTERNAL MEDICINE

## 2024-02-08 PROCEDURE — 80061 LIPID PANEL: CPT

## 2024-02-08 RX ORDER — CLOPIDOGREL BISULFATE 75 MG/1
75 TABLET ORAL DAILY
Qty: 90 TABLET | Refills: 3 | Status: SHIPPED | OUTPATIENT
Start: 2024-02-08 | End: 2025-02-07

## 2024-02-08 RX ORDER — ATORVASTATIN CALCIUM 80 MG/1
80 TABLET, FILM COATED ORAL DAILY
Qty: 90 TABLET | Refills: 3 | Status: SHIPPED | OUTPATIENT
Start: 2024-02-08 | End: 2025-02-07

## 2024-02-08 RX ORDER — METOPROLOL SUCCINATE 50 MG/1
50 TABLET, EXTENDED RELEASE ORAL DAILY
Qty: 90 TABLET | Refills: 3 | Status: SHIPPED | OUTPATIENT
Start: 2024-02-08 | End: 2025-02-07

## 2024-02-08 NOTE — PROGRESS NOTES
Chloé Stone (:  1965) is a 58 y.o. female,Established patient, here for evaluation of the following chief complaint(s):  No chief complaint on file.         ASSESSMENT/PLAN:  1. Lumbar post-laminectomy syndrome  -     [id number] PRG/REF DR; Future     Patient presents for Refill  of IT morphine pump with complaint of back pain. good Relief of pain Able to get back to work force. Good wound healing, No head ach,    Pump Refill: Discussed Procedure, Pump accessed with , Changed volume to 20ccs,   Site cleansed with Betadine, Port accessed with 25G needle, With mikey 3cc fluid refilled with 20cc of Morphine 2 mg per cc and Bupivacaine 2mg per cc, Current rate 0.25 mg a day alarm date is 24    Subjective   SUBJECTIVE/OBJECTIVE:  Patient presents with complaint of back pain. Mostly in the back with occational Radiation, Had Epidurals, Laminotomies with no Relief.   MRI shows Pedicle screws inserted at the L3, L4 and L5 levels bilaterally. Intervertebral spacers have been inserted at L3-4 and L4-5 levels and cause artifacts. No abnormal sites of enhancement. Last PT did not help and made it worse. Was on narcs and had a suicidal attempt with them in the past, On Plavix since 3 years for a cardiac stent,  Review of Systems   Constitutional: Negative.    HENT: Negative.     Eyes: Negative.    Respiratory: Negative.          H/o COPD   Cardiovascular: Negative.         Single stent 3 years ago   Gastrointestinal: Negative.    Endocrine: Negative.    Genitourinary: Negative.    Musculoskeletal: Negative.    Skin: Negative.    Allergic/Immunologic: Negative.    Neurological: Negative.    Hematological: Negative.    Psychiatric/Behavioral: Negative.     All other systems reviewed and are negative.         Objective   Physical Exam  Constitutional:       Appearance: Normal appearance. She is normal weight.   HENT:      Head: Normocephalic and atraumatic.      Nose: Nose normal.      Mouth/Throat:

## 2024-02-08 NOTE — PROGRESS NOTES
CARDIOLOGY OFFICE VISIT      CHIEF COMPLAINT      HISTORY OF PRESENT ILLNESS  The patient states she has been doing well from a cardiac standpoint.  She denies any significant chest discomfort or symptoms of myocardial ischemia.  She has not used nitroglycerin.  She states she does have a little bit of dyspnea with activities but this is improving since I saw her last time since she is lost some weight.  She denies any significant palpitations, presyncope or syncope.  Unfortunately, she still continues to smoke cigarettes.  She denies any problem with her current medication.  She has not had any lab work done for a while.  I told her to obtain a lipid profile today and I will call her with the results.    Impression:  Coronary artery disease, no angina  Inferior myocardial infarction 10/8/18  PCI with drug-eluting stent right coronary 10/8/18  Hypertension  Hyperlipidemia  Tobacco abuse  Overweight  Hypothyroidism on replacement therapy     Please excuse any errors in grammar or translation related to this dictation. Voice recognition software was utilized to prepare this document.     Past Medical History  Past Medical History:   Diagnosis Date    CAD S/P percutaneous coronary angioplasty     Current smoker     Hyperlipidemia     Hypertension     Hypothyroidism     Old myocardial infarction 01/20/2022    History of myocardial infarction    Old myocardial infarction     History of myocardial infarction    Personal history of other diseases of the circulatory system     History of cardiac disorder    Personal history of other diseases of the circulatory system     History of hypertension    Personal history of other mental and behavioral disorders 05/31/2019    History of depression    Tachycardia     Tachycardia        Social History  Social History     Tobacco Use    Smoking status: Every Day     Packs/day: 1     Types: Cigarettes     Passive exposure: Never    Smokeless tobacco: Never   Substance Use Topics     "Alcohol use: Yes     Comment: social    Drug use: Never       Family History     Family History   Problem Relation Name Age of Onset    Asthma Father      Heart failure Father      Hypertension Father      Kidney disease Father      Asthma Sister      Other (drug dependence) Sister      Other (drug dependence) Brother          Allergies:  Allergies   Allergen Reactions    Gentamicin Anaphylaxis     \"went into shock\"    Codeine Itching        Outpatient Medications:  Current Outpatient Medications   Medication Instructions    albuterol 90 mcg/actuation inhaler INHALE 1 PUFF EVERY 4 HOURS AS NEEDED.    atorvastatin (Lipitor) 80 mg tablet 1 tablet, oral, Daily    levothyroxine (Synthroid, Levoxyl) 25 mcg tablet take 1 tablet by mouth once daily ON AN EMPTY STOMACH, 1 HOUR BEFORE THE FIRST MEAL OF THE DAY    metoprolol succinate XL (TOPROL-XL) 50 mg, oral, Daily    nitroglycerin (Nitrostat) 0.4 mg SL tablet DISSOLVE 1 TABLET UNDER THE TONGUE EVERY 5 MINUTES AS NEEDED CHEST PAIN    omeprazole (PriLOSEC) 40 mg DR capsule 1 capsule, oral, Daily    Symbicort 160-4.5 mcg/actuation inhaler inhale 2 puffs by mouth twice a day IN THE MORNING AND THE EVENING          REVIEW OF SYSTEMS  Review of Systems   All other systems reviewed and are negative.        VITALS  Vitals:    02/08/24 1421   BP: 126/76   Pulse: 84       PHYSICAL EXAM  Vitals reviewed.   Constitutional:       Appearance: Normal and healthy appearance. Well-developed and not in distress.   Eyes:      Conjunctiva/sclera: Conjunctivae normal.      Pupils: Pupils are equal, round, and reactive to light.   Neck:      Vascular: No JVR. JVD normal.   Pulmonary:      Effort: Pulmonary effort is normal.      Breath sounds: Normal breath sounds. No wheezing. No rhonchi. No rales.   Chest:      Chest wall: Not tender to palpatation.   Cardiovascular:      PMI at left midclavicular line. Normal rate. Regular rhythm. Normal S1. Normal S2.       Murmurs: There is no murmur.     "  No gallop.  No click. No rub.   Pulses:     Intact distal pulses.   Edema:     Peripheral edema absent.   Abdominal:      Tenderness: There is no abdominal tenderness.   Musculoskeletal: Normal range of motion.         General: No tenderness.      Cervical back: Normal range of motion. Skin:     General: Skin is warm and dry.   Neurological:      General: No focal deficit present.      Mental Status: Alert and oriented to person, place and time.   Psychiatric:         Behavior: Behavior is cooperative.           ASSESSMENT AND PLAN  Diagnoses and all orders for this visit:  CAD S/P percutaneous coronary angioplasty  Arteriosclerosis of coronary artery in patient with history of myocardial infarction  HTN (hypertension), benign  Mixed hyperlipidemia  Acquired hypothyroidism  BMI 28.0-28.9,adult  Current smoker      [unfilled]

## 2024-02-08 NOTE — PATIENT INSTRUCTIONS
Patient to follow up in 1 year with Dr. Que Yu MD      Please repeat lab work today- orders in system, will call with results.   Please repeat lab work in a year before next visit- orders in system.     No changes today.   Continue same medications and treatments.   Patient educated on proper medication use.   Patient educated on risk factor modification.   Please bring any lab results from other providers / physicians to your next appointment.     Please bring all medicines, vitamins, and herbal supplements with you when you come to the office.     Prescriptions will not be filled unless you are compliant with your follow up appointments or have a follow up appointment scheduled as per instruction of your physician. Refills should be requested at the time of your visit.    IAlvaro RN am scribing for and in the presence of Dr. Que Villasenor MD

## 2024-02-09 ENCOUNTER — TELEPHONE (OUTPATIENT)
Dept: CARDIOLOGY | Facility: CLINIC | Age: 59
End: 2024-02-09
Payer: COMMERCIAL

## 2024-02-09 NOTE — TELEPHONE ENCOUNTER
----- Message from Que Villasenor MD sent at 2/9/2024 12:43 PM EST -----  Cholesterol and triglycerides are both elevated especially triglycerides 438, see if she is taking her atorvastatin 80 mg a day if she is having trouble remembering have her take in the morning and not in the evening, explained the risk of pancreatitis with such high triglycerides and to start her on fenofibrate 145 mg a day, recheck lipid profile in 2 months  ----- Message -----  From: Lab, Background User  Sent: 2/8/2024   8:17 PM EST  To: Que Villasenor MD

## 2024-02-09 NOTE — TELEPHONE ENCOUNTER
2/9  Call placed to patient.  Voice mailbox is full and cannot accept any messages at this time.  Will attempt calling patient later.  2/12  Call again placed to patient, voice mailbox is full. Letter mailed to patient

## 2024-02-09 NOTE — LETTER
February 12, 2024    Shazia JOSETTE Aiden  [address] [place] [id number]      Dear Ms. Wolfe:    The results of your recent cholesterol test are abnormal. They are as follows:    Cholesterol (mg/dL)   Date Value   02/08/2024 241 (H)     LDL Calculated (no units)   Date Value   02/08/2024      Comment:     The calculation of LDL and VLDL are inaccurate when the Triglycerides are greater than 400 mg/dL or when the patient is non-fasting. If LDL measurement is necessary contact the testing laboratory for an alternative LDL assay.                                  Near   Borderline      AGE      Desirable  Optimal    High     High     Very High     0-19 Y     0 - 109     ---    110-129   >/= 130     ----    20-24 Y     0 - 119     ---    120-159   >/= 160     ----      >24 Y     0 -  99   100-129  130-159   160-189     >/=190       HDL-Cholesterol (mg/dL)   Date Value   02/08/2024 62.7     Triglycerides (mg/dL)   Date Value   02/08/2024 438 (H)       Please contact the office at [phone] option 4 upon receipt of this letter for recommendations from Dr. Que Villasenor     If you have any questions or concerns, please don't hesitate to call.         Sincerely,        Nursing

## 2024-03-27 ENCOUNTER — TELEPHONE (OUTPATIENT)
Dept: PAIN MANAGEMENT | Age: 59
End: 2024-03-27

## 2024-03-27 NOTE — TELEPHONE ENCOUNTER
Patient states that she has been having a lot more back pain, her sciatica has been worse. She says she went back to work in November and had to move a lot of heavy patients. She is concerned that the catheter in her pain pump has moved out of place, she is wondering if Dr Farley can order an Xray to  check on it?

## 2024-03-29 ENCOUNTER — OFFICE VISIT (OUTPATIENT)
Dept: PAIN MANAGEMENT | Age: 59
End: 2024-03-29
Payer: COMMERCIAL

## 2024-03-29 VITALS
DIASTOLIC BLOOD PRESSURE: 80 MMHG | WEIGHT: 152 LBS | HEIGHT: 60 IN | BODY MASS INDEX: 29.84 KG/M2 | SYSTOLIC BLOOD PRESSURE: 152 MMHG

## 2024-03-29 DIAGNOSIS — M51.36 DEGENERATION OF LUMBAR INTERVERTEBRAL DISC: ICD-10-CM

## 2024-03-29 DIAGNOSIS — M96.1 LUMBAR POST-LAMINECTOMY SYNDROME: Primary | ICD-10-CM

## 2024-03-29 DIAGNOSIS — M47.817 LUMBOSACRAL SPONDYLOSIS WITHOUT MYELOPATHY: ICD-10-CM

## 2024-03-29 PROCEDURE — 3077F SYST BP >= 140 MM HG: CPT | Performed by: PAIN MEDICINE

## 2024-03-29 PROCEDURE — 3079F DIAST BP 80-89 MM HG: CPT | Performed by: PAIN MEDICINE

## 2024-03-29 PROCEDURE — 99213 OFFICE O/P EST LOW 20 MIN: CPT | Performed by: PAIN MEDICINE

## 2024-03-29 NOTE — PROGRESS NOTES
Chloé Stone (:  1965) is a 58 y.o. female,Established patient, here for evaluation of the following chief complaint(s):  No chief complaint on file.     ASSESSMENT/PLAN:  1. Lumbar post-laminectomy syndrome  -     [id number] PRG/REF DR; Future.   Patient presents  with complaint of back pain. good Relief of pain Able to get back to work force after Pump placement. Good wound healing, No head ach,  Increased her IT Pain meds from 0.25mg to 0.3mg. will get Xrays and follow.    Subjective   SUBJECTIVE/OBJECTIVE:  Patient presents with complaint of back pain. Mostly in the back with occational Radiation, Had Epidurals, Laminotomies with no Relief.   MRI shows Pedicle screws inserted at the L3, L4 and L5 levels bilaterally. Intervertebral spacers have been inserted at L3-4 and L4-5 levels and cause artifacts. No abnormal sites of enhancement. Last PT did not help and made it worse. Was on narcs and had a suicidal attempt with them in the past, On Plavix since 3 years for a cardiac stent,  Review of Systems   Constitutional: Negative.    HENT: Negative.     Eyes: Negative.    Respiratory: Negative.          H/o COPD   Cardiovascular: Negative.         Single stent 3 years ago   Gastrointestinal: Negative.    Endocrine: Negative.    Genitourinary: Negative.    Musculoskeletal: Negative.    Skin: Negative.    Allergic/Immunologic: Negative.    Neurological: Negative.    Hematological: Negative.    Psychiatric/Behavioral: Negative.     All other systems reviewed and are negative.         Objective   Physical Exam  Constitutional:       Appearance: Normal appearance. She is normal weight.   HENT:      Head: Normocephalic and atraumatic.      Nose: Nose normal.      Mouth/Throat:      Mouth: Mucous membranes are moist.   Eyes:      Extraocular Movements: Extraocular movements intact.      Conjunctiva/sclera: Conjunctivae normal.      Pupils: Pupils are equal, round, and reactive to light.   Cardiovascular:

## 2024-04-04 ENCOUNTER — TELEPHONE (OUTPATIENT)
Dept: PAIN MANAGEMENT | Age: 59
End: 2024-04-04

## 2024-04-04 DIAGNOSIS — M47.817 LUMBOSACRAL SPONDYLOSIS WITHOUT MYELOPATHY: ICD-10-CM

## 2024-04-04 DIAGNOSIS — M51.36 DEGENERATION OF LUMBAR INTERVERTEBRAL DISC: ICD-10-CM

## 2024-04-04 DIAGNOSIS — M96.1 LUMBAR POST-LAMINECTOMY SYNDROME: ICD-10-CM

## 2024-04-04 PROCEDURE — 72072 X-RAY EXAM THORAC SPINE 3VWS: CPT | Performed by: PAIN MEDICINE

## 2024-04-04 NOTE — TELEPHONE ENCOUNTER
Patient called asking if she could be told the results of her Xray that Dr. Farley ordered? She says she had them done at [facility].

## 2024-04-18 DIAGNOSIS — E03.9 HYPOTHYROIDISM, UNSPECIFIED TYPE: ICD-10-CM

## 2024-04-18 NOTE — TELEPHONE ENCOUNTER
Pt left message wanting to know if Dr. Villasenor will give her a couple of refills on her Levothyroxine.      Routed to Janelle TOMLINSON LPN

## 2024-04-19 RX ORDER — LEVOTHYROXINE SODIUM 25 UG/1
TABLET ORAL
Qty: 90 TABLET | Refills: 0 | Status: SHIPPED | OUTPATIENT
Start: 2024-04-19

## 2024-04-19 NOTE — TELEPHONE ENCOUNTER
Per Dr. Que Villasenor , may refill for 90 days, no refills.  Patient needs to obtain a PCP to manage her Thyroid.  Dr. Que Villasenor is doing this as a courtesy.  However, this needs managed and followed with labs by PCP.  Rx sent.    Message left on voice mail for patient to call office and will need advised of above.

## 2024-04-23 ENCOUNTER — TELEPHONE (OUTPATIENT)
Dept: PAIN MANAGEMENT | Age: 59
End: 2024-04-23

## 2024-04-23 NOTE — TELEPHONE ENCOUNTER
CREATED AND FAXED PUMP REFILL ORDER TO [facility] ([facility]          ORDER ID: [id number]     EXPECTED DELIVERY DATE:  05/01/2024     REFILL DATE: 5/3/2024    CHANGE FROM PREVIOUS ORDER: NO    HOME CONNECT: NO    MEDICATION ORDERED/CONCENTRATION/UNIT    PF Morphine Sulfate 2 mg/mL  PF Bupivacaine HCl 2 mg/mL    TOTAL VOLUME 20 (twenty) mL     [facility] ORDER ATTACHED TO THIS ENCOUNTER.

## 2024-05-02 NOTE — TELEPHONE ENCOUNTER
RECEIVED MEDICATION FOR PUMP REFILL. MEDICATION IS IN THE POST-OP ROOM IN THE LEFT CABINET.     MEDICATION USE BY DATE: 05/29/2024

## 2024-05-08 ENCOUNTER — TELEPHONE (OUTPATIENT)
Dept: PAIN MANAGEMENT | Age: 59
End: 2024-05-08

## 2024-05-08 NOTE — TELEPHONE ENCOUNTER
PER MK, CALLED PT. TO GET HER R/S'D FOR PUMP REFILL.  RACHANA@10AM.  REMINDED PT. THAT WE HAVE A BRIEF WINDOW TO USE MEDICATION, OTW, IT WILL NEED TO BE THROWN OUT.  ASKED THAT PT. CALL THE OFFICE AT HER EARLIEST CONVENIENCE TO R/S.

## 2024-05-10 ENCOUNTER — PROCEDURE VISIT (OUTPATIENT)
Dept: PAIN MANAGEMENT | Age: 59
End: 2024-05-10

## 2024-05-10 DIAGNOSIS — M96.1 LUMBAR POST-LAMINECTOMY SYNDROME: Primary | ICD-10-CM

## 2024-05-10 NOTE — PROGRESS NOTES
Chloé Stone (:  1965) is a 58 y.o. female,Established patient, here for evaluation of the following chief complaint(s):  No chief complaint on file.         ASSESSMENT/PLAN:  1. Lumbar post-laminectomy syndrome  -     [id number] PRG/REF DR; Future     Patient presents for Refill  of IT morphine pump with complaint of back pain. good Relief of pain Able to get back to work force. Good wound healing, No head ach,    Pump Refill: Discussed Procedure, Pump accessed with , Changed volume to 20ccs,   Site cleansed with Betadine, Port accessed with 25G needle, With mikey 3cc fluid refilled with 20cc of Morphine 2 mg per cc and Bupivacaine 2mg per cc, Current rate 0.3 mg a day alarm date is 24.    Subjective   SUBJECTIVE/OBJECTIVE:  Patient presents with complaint of back pain. Mostly in the back with occational Radiation, Had Epidurals, Laminotomies with no Relief.   MRI shows Pedicle screws inserted at the L3, L4 and L5 levels bilaterally. Intervertebral spacers have been inserted at L3-4 and L4-5 levels and cause artifacts. No abnormal sites of enhancement. Last PT did not help and made it worse. Was on narcs and had a suicidal attempt with them in the past, On Plavix since 3 years for a cardiac stent,  Review of Systems   Constitutional: Negative.    HENT: Negative.     Eyes: Negative.    Respiratory: Negative.          H/o COPD   Cardiovascular: Negative.         Single stent 3 years ago   Gastrointestinal: Negative.    Endocrine: Negative.    Genitourinary: Negative.    Musculoskeletal: Negative.    Skin: Negative.    Allergic/Immunologic: Negative.    Neurological: Negative.    Hematological: Negative.    Psychiatric/Behavioral: Negative.     All other systems reviewed and are negative.         Objective   Physical Exam  Constitutional:       Appearance: Normal appearance. She is normal weight.   HENT:      Head: Normocephalic and atraumatic.      Nose: Nose normal.      Mouth/Throat:

## 2024-07-30 NOTE — TELEPHONE ENCOUNTER
Patient stated she has been trying to find a PCP but no one will accept her as a patient or accept her insurance and now she needs a refill on her synthroid. Patient does not know what to do. Routed to Janelle Lundy, KRISTOFER

## 2024-07-30 NOTE — TELEPHONE ENCOUNTER
Attempted contacting patient.  Left voice mail requesting return call.   Patient was advised in April that Dr. Que Villasenor would do a 90 day courtesy fill with no refills.  Again this needs to be managed by a PCP or endocrinologist.  Advise patient to check with her Insurance carrier to find a PCP.

## 2024-07-31 ENCOUNTER — TELEPHONE (OUTPATIENT)
Dept: PAIN MANAGEMENT | Age: 59
End: 2024-07-31

## 2024-07-31 NOTE — TELEPHONE ENCOUNTER
CREATED AND FAXED PUMP REFILL ORDER TO [facility] ([facility]          ORDER ID: [id number]     EXPECTED DELIVERY DATE:  08/01/2024     REFILL DATE: 8/2/2024    CHANGE FROM PREVIOUS ORDER: NO    HOME CONNECT: NO    MEDICATION ORDERED/CONCENTRATION/UNIT    PF Morphine Sulfate 2 mg/mL  PF Bupivacaine HCl 2 mg/mL    TOTAL VOLUME 20 (twenty) mL     [facility] ORDER ATTACHED TO THIS ENCOUNTER.

## 2024-08-01 NOTE — TELEPHONE ENCOUNTER
RECEIVED MEDICATION FOR PUMP REFILL. MEDICATION IS IN THE POST-OP ROOM IN THE LEFT CABINET.     MEDICATION USE BY DATE: 08/30/2024

## 2024-08-02 ENCOUNTER — PROCEDURE VISIT (OUTPATIENT)
Dept: PAIN MANAGEMENT | Age: 59
End: 2024-08-02

## 2024-08-02 DIAGNOSIS — M96.1 LUMBAR POST-LAMINECTOMY SYNDROME: Primary | ICD-10-CM

## 2024-08-02 NOTE — PROGRESS NOTES
Mouth/Throat:      Mouth: Mucous membranes are moist.   Eyes:      Extraocular Movements: Extraocular movements intact.      Conjunctiva/sclera: Conjunctivae normal.      Pupils: Pupils are equal, round, and reactive to light.   Cardiovascular:      Rate and Rhythm: Normal rate and regular rhythm.      Pulses: Normal pulses.      Heart sounds: Normal heart sounds.   Pulmonary:      Effort: Pulmonary effort is normal.   Abdominal:      General: Abdomen is flat. Bowel sounds are normal.      Palpations: Abdomen is soft.   Musculoskeletal:         General: Normal range of motion.      Cervical back: Normal range of motion and neck supple.   Skin:     General: Skin is warm.   Neurological:      General: No focal deficit present.      Mental Status: She is alert and oriented to person, place, and time. Mental status is at baseline.      Cranial Nerves: No cranial nerve deficit.      Sensory: No sensory deficit.      Motor: No weakness.   Psychiatric:         Mood and Affect: Mood normal.         Behavior: Behavior normal.         Thought Content: Thought content normal.         Judgment: Judgment normal.     --Arpit Farley MD

## 2024-08-02 NOTE — PATIENT INSTRUCTIONS
NEW ADDRESS effective 9/3/2024.  [facility], a department of [facility]  [address], [address]  [place] [id number]

## 2024-08-19 ENCOUNTER — OFFICE VISIT (OUTPATIENT)
Dept: PAIN MANAGEMENT | Age: 59
End: 2024-08-19
Payer: COMMERCIAL

## 2024-08-19 VITALS
WEIGHT: 142 LBS | SYSTOLIC BLOOD PRESSURE: 134 MMHG | BODY MASS INDEX: 27.88 KG/M2 | DIASTOLIC BLOOD PRESSURE: 78 MMHG | TEMPERATURE: 97 F | HEIGHT: 60 IN

## 2024-08-19 DIAGNOSIS — M96.1 LUMBAR POST-LAMINECTOMY SYNDROME: Primary | ICD-10-CM

## 2024-08-19 PROCEDURE — 3075F SYST BP GE 130 - 139MM HG: CPT | Performed by: PAIN MEDICINE

## 2024-08-19 PROCEDURE — 99213 OFFICE O/P EST LOW 20 MIN: CPT | Performed by: PAIN MEDICINE

## 2024-08-19 PROCEDURE — 3078F DIAST BP <80 MM HG: CPT | Performed by: PAIN MEDICINE

## 2024-08-19 NOTE — PROGRESS NOTES
Chloé Stone (:  1965) is a 58 y.o. female,Established patient, here for evaluation of the following chief complaint(s):  No chief complaint on file.         ASSESSMENT/PLAN:  1. Lumbar post-laminectomy syndrome, New onset ibncrease in pain and possible weakness.  -     Will plan on MRI w/wo Contrast and follow.         Subjective   SUBJECTIVE/OBJECTIVE:  Patient presents for pain Low back radiating to Left buttock thigh calf ant to her toes past 1-2 months. No loss of Bowel or bladder controll,  Feels like leg is giving out but not fallen yet, Had Epidurals, Laminotomies with no Relief.   MRI shows Pedicle screws inserted at the L3, L4 and L5 levels bilaterally. Intervertebral spacers have been inserted at L3-4 and L4-5 levels and cause artifacts. No abnormal sites of enhancement. Last PT did not help and made it worse. Was on narcs and had a suicidal attempt with them in the past, On Plavix since 3 years for a cardiac stent,  Review of Systems   Constitutional: Negative.    HENT: Negative.     Eyes: Negative.    Respiratory: Negative.          H/o COPD   Cardiovascular: Negative.         Single stent 3 years ago   Gastrointestinal: Negative.    Endocrine: Negative.    Genitourinary: Negative.    Musculoskeletal: Negative.    Skin: Negative.    Allergic/Immunologic: Negative.    Neurological: Negative.    Hematological: Negative.    Psychiatric/Behavioral: Negative.     All other systems reviewed and are negative.         Objective   Physical Exam  Constitutional:       Appearance: Normal appearance. She is normal weight.   HENT:      Head: Normocephalic and atraumatic.      Nose: Nose normal.      Mouth/Throat:      Mouth: Mucous membranes are moist.   Eyes:      Extraocular Movements: Extraocular movements intact.      Conjunctiva/sclera: Conjunctivae normal.      Pupils: Pupils are equal, round, and reactive to light.   Cardiovascular:      Rate and Rhythm: Normal rate and regular rhythm.

## 2024-08-22 ENCOUNTER — TELEPHONE (OUTPATIENT)
Age: 59
End: 2024-08-22

## 2024-08-22 NOTE — TELEPHONE ENCOUNTER
Left message for patient to schedule follow up. Patient has been having increased pain and  would like to see her in office to increase her pain pump dose.

## 2024-08-23 ENCOUNTER — OFFICE VISIT (OUTPATIENT)
Dept: PAIN MANAGEMENT | Age: 59
End: 2024-08-23
Payer: COMMERCIAL

## 2024-08-23 VITALS
SYSTOLIC BLOOD PRESSURE: 132 MMHG | HEIGHT: 60 IN | DIASTOLIC BLOOD PRESSURE: 84 MMHG | WEIGHT: 142 LBS | BODY MASS INDEX: 27.88 KG/M2 | TEMPERATURE: 97.6 F

## 2024-08-23 DIAGNOSIS — M96.1 LUMBAR POST-LAMINECTOMY SYNDROME: Primary | ICD-10-CM

## 2024-08-23 PROCEDURE — 3079F DIAST BP 80-89 MM HG: CPT | Performed by: PAIN MEDICINE

## 2024-08-23 PROCEDURE — 3075F SYST BP GE 130 - 139MM HG: CPT | Performed by: PAIN MEDICINE

## 2024-08-23 PROCEDURE — 99213 OFFICE O/P EST LOW 20 MIN: CPT | Performed by: PAIN MEDICINE

## 2024-08-23 NOTE — PROGRESS NOTES
Chloé Stone (:  1965) is a 58 y.o. female,Established patient, here for evaluation of the following chief complaint(s):  No chief complaint on file.         ASSESSMENT/PLAN:  1. Lumbar post-laminectomy syndrome, New onset ibncrease in pain and possible weakness.  -     Will plan on MRI w/wo Contrast and follow.  Increased Pump rate from 0.4 to 0.6 mg Refill Date 10/8/24.         Subjective   SUBJECTIVE/OBJECTIVE:  Patient presents for pain Low back radiating to Left buttock thigh calf ant to her toes past 1-2 months. No loss of Bowel or bladder controll,  Feels like leg is giving out but not fallen yet, Had Epidurals, Laminotomies with no Relief.   MRI shows Pedicle screws inserted at the L3, L4 and L5 levels bilaterally. Intervertebral spacers have been inserted at L3-4 and L4-5 levels and cause artifacts. No abnormal sites of enhancement. Last PT did not help and made it worse. Was on narcs and had a suicidal attempt with them in the past, On Plavix since 3 years for a cardiac stent,  Review of Systems   Constitutional: Negative.    HENT: Negative.     Eyes: Negative.    Respiratory: Negative.          H/o COPD   Cardiovascular: Negative.         Single stent 3 years ago   Gastrointestinal: Negative.    Endocrine: Negative.    Genitourinary: Negative.    Musculoskeletal: Negative.    Skin: Negative.    Allergic/Immunologic: Negative.    Neurological: Negative.    Hematological: Negative.    Psychiatric/Behavioral: Negative.     All other systems reviewed and are negative.         Objective   Physical Exam  Constitutional:       Appearance: Normal appearance. She is normal weight.   HENT:      Head: Normocephalic and atraumatic.      Nose: Nose normal.      Mouth/Throat:      Mouth: Mucous membranes are moist.   Eyes:      Extraocular Movements: Extraocular movements intact.      Conjunctiva/sclera: Conjunctivae normal.      Pupils: Pupils are equal, round, and reactive to light.   Cardiovascular:

## 2024-09-04 ENCOUNTER — TELEPHONE (OUTPATIENT)
Dept: CARDIOLOGY | Facility: CLINIC | Age: 59
End: 2024-09-04
Payer: COMMERCIAL

## 2024-09-04 DIAGNOSIS — I25.10 ARTERIOSCLEROSIS OF CORONARY ARTERY IN PATIENT WITH HISTORY OF MYOCARDIAL INFARCTION: ICD-10-CM

## 2024-09-04 DIAGNOSIS — I25.2 ARTERIOSCLEROSIS OF CORONARY ARTERY IN PATIENT WITH HISTORY OF MYOCARDIAL INFARCTION: ICD-10-CM

## 2024-09-04 RX ORDER — CLOPIDOGREL BISULFATE 75 MG/1
75 TABLET ORAL DAILY
Qty: 90 TABLET | Refills: 3 | Status: SHIPPED | OUTPATIENT
Start: 2024-09-04 | End: 2025-09-04

## 2024-09-04 RX ORDER — METOPROLOL SUCCINATE 50 MG/1
50 TABLET, EXTENDED RELEASE ORAL DAILY
Qty: 90 TABLET | Refills: 3 | Status: SHIPPED | OUTPATIENT
Start: 2024-09-04 | End: 2025-09-04

## 2024-09-04 NOTE — TELEPHONE ENCOUNTER
Patient called and LM stating she is getting an MRI done and needs clearance from Dr. Que Villasenor MD. Her provider needs to know what exact stent she has and where it is at in the heart. Routed to Janelle Lundy LPN

## 2024-09-04 NOTE — TELEPHONE ENCOUNTER
Per Dr. Que Villasenor , patient is acceptable to proceed with the MRI.  Her stent is a Resolute Emanuel stent in the mid RCA.    Call placed to patient and advised.  Patient will stop by the office on 9/9 to  a copy of this note and her cath report.

## 2024-09-04 NOTE — TELEPHONE ENCOUNTER
Received request for prescription refill for patient.  Patient follows with Dr. Que Villasenor MD     Request is for Plavix and metoprolol  Is patient currently on medication- yes    Last OV- 2/8/24  Next OV- 2/6/25    Pended for signing and sent to provider.

## 2024-09-09 ENCOUNTER — TELEPHONE (OUTPATIENT)
Dept: CARDIOLOGY | Facility: CLINIC | Age: 59
End: 2024-09-09
Payer: COMMERCIAL

## 2024-09-09 ENCOUNTER — HOSPITAL ENCOUNTER (OUTPATIENT)
Dept: MRI IMAGING | Age: 59
Discharge: HOME | End: 2024-09-11
Attending: PAIN MEDICINE
Payer: COMMERCIAL

## 2024-09-09 DIAGNOSIS — M96.1 LUMBAR POST-LAMINECTOMY SYNDROME: ICD-10-CM

## 2024-09-09 PROCEDURE — 72158 MRI LUMBAR SPINE W/O & W/DYE: CPT

## 2024-09-09 PROCEDURE — A9579 GAD-BASE MR CONTRAST NOS,1ML: HCPCS | Performed by: PAIN MEDICINE

## 2024-09-09 PROCEDURE — 6360000004 HC RX CONTRAST MEDICATION: Performed by: PAIN MEDICINE

## 2024-09-09 RX ADMIN — GADOTERIDOL 15 ML: 279.3 INJECTION, SOLUTION INTRAVENOUS at 19:17

## 2024-09-10 DIAGNOSIS — M54.16 LUMBAR RADICULOPATHY: Primary | ICD-10-CM

## 2024-09-10 DIAGNOSIS — M47.817 LUMBOSACRAL SPONDYLOSIS WITHOUT MYELOPATHY: ICD-10-CM

## 2024-09-10 DIAGNOSIS — M96.1 LUMBAR POST-LAMINECTOMY SYNDROME: ICD-10-CM

## 2024-09-10 DIAGNOSIS — J44.89 ASTHMA-CHRONIC OBSTRUCTIVE PULMONARY DISEASE OVERLAP SYNDROME (HCC): ICD-10-CM

## 2024-09-10 DIAGNOSIS — M47.812 CERVICAL SPONDYLOSIS WITHOUT MYELOPATHY: ICD-10-CM

## 2024-09-10 DIAGNOSIS — M51.36 DEGENERATION OF LUMBAR INTERVERTEBRAL DISC: ICD-10-CM

## 2024-09-12 ENCOUNTER — TELEPHONE (OUTPATIENT)
Age: 59
End: 2024-09-12

## 2024-09-12 NOTE — TELEPHONE ENCOUNTER
Comments: No answer when calling the patient to schedule an appointment     Last Office Visit (last PCP visit):   7/15/2022    Next Visit Date:  Future Appointments   Date Time Provider Department Center   9/13/2024  1:30 PM Arpit Farley MD [facility]   10/7/2024  1:00 PM Arpit Farley MD [facility]       **If hasn't been seen in over a year OR hasn't followed up according to last diabetes/ADHD visit, make appointment for patient before sending refill to provider.    Rx requested:  Requested Prescriptions     Pending Prescriptions Disp Refills    diclofenac sodium (VOLTAREN) 1 %  g 2     Sig: Apply 4 g topically 2 times daily

## 2024-09-12 NOTE — TELEPHONE ENCOUNTER
Comments: No answer when call the patient to schedule an appointment    Last Office Visit (last PCP visit):   7/15/2022    Next Visit Date:  Future Appointments   Date Time Provider Department Center   9/13/2024  1:30 PM Arpit Farley MD [facility]   10/7/2024  1:00 PM Arpit Farley MD [facility]       **If hasn't been seen in over a year OR hasn't followed up according to last diabetes/ADHD visit, make appointment for patient before sending refill to provider.    Rx requested:  Requested Prescriptions     Pending Prescriptions Disp Refills    albuterol sulfate HFA (VENTOLIN HFA) 108 (90 Base) MCG/ACT inhaler 1 each 0     Sig: Ventolin HFA 90 mcg/actuation aerosol inhaler   2 puffs prn

## 2024-09-13 RX ORDER — ALBUTEROL SULFATE 90 UG/1
INHALANT RESPIRATORY (INHALATION)
Qty: 1 EACH | Refills: 0 | OUTPATIENT
Start: 2024-09-13

## 2024-09-19 ENCOUNTER — PROCEDURE VISIT (OUTPATIENT)
Age: 59
End: 2024-09-19
Payer: COMMERCIAL

## 2024-09-19 VITALS
SYSTOLIC BLOOD PRESSURE: 144 MMHG | BODY MASS INDEX: 27.68 KG/M2 | TEMPERATURE: 98.9 F | OXYGEN SATURATION: 96 % | DIASTOLIC BLOOD PRESSURE: 88 MMHG | WEIGHT: 141 LBS | HEART RATE: 98 BPM | HEIGHT: 60 IN

## 2024-09-19 DIAGNOSIS — M96.1 LUMBAR POST-LAMINECTOMY SYNDROME: Primary | ICD-10-CM

## 2024-09-19 RX ORDER — LIDOCAINE HYDROCHLORIDE 10 MG/ML
3 INJECTION, SOLUTION EPIDURAL; INFILTRATION; INTRACAUDAL; PERINEURAL ONCE
Status: COMPLETED | OUTPATIENT
Start: 2024-09-19 | End: 2024-09-19

## 2024-09-19 RX ORDER — TRIAMCINOLONE ACETONIDE 40 MG/ML
40 INJECTION, SUSPENSION INTRA-ARTICULAR; INTRAMUSCULAR ONCE
Status: COMPLETED | OUTPATIENT
Start: 2024-09-19 | End: 2024-09-19

## 2024-09-19 RX ORDER — BUPIVACAINE HYDROCHLORIDE 2.5 MG/ML
30 INJECTION, SOLUTION EPIDURAL; INFILTRATION; INTRACAUDAL ONCE
Status: COMPLETED | OUTPATIENT
Start: 2024-09-19 | End: 2024-09-19

## 2024-09-19 RX ADMIN — LIDOCAINE HYDROCHLORIDE 3 ML: 10 INJECTION, SOLUTION EPIDURAL; INFILTRATION; INTRACAUDAL; PERINEURAL at 14:19

## 2024-09-19 RX ADMIN — TRIAMCINOLONE ACETONIDE 40 MG: 40 INJECTION, SUSPENSION INTRA-ARTICULAR; INTRAMUSCULAR at 14:19

## 2024-09-19 RX ADMIN — BUPIVACAINE HYDROCHLORIDE 75 MG: 2.5 INJECTION, SOLUTION EPIDURAL; INFILTRATION; INTRACAUDAL at 14:19

## 2024-09-20 ENCOUNTER — TELEPHONE (OUTPATIENT)
Age: 59
End: 2024-09-20

## 2024-09-23 ENCOUNTER — OFFICE VISIT (OUTPATIENT)
Age: 59
End: 2024-09-23
Payer: COMMERCIAL

## 2024-09-23 VITALS
BODY MASS INDEX: 27.68 KG/M2 | DIASTOLIC BLOOD PRESSURE: 80 MMHG | HEIGHT: 60 IN | TEMPERATURE: 97.6 F | SYSTOLIC BLOOD PRESSURE: 132 MMHG | WEIGHT: 141 LBS

## 2024-09-23 DIAGNOSIS — M96.1 LUMBAR POST-LAMINECTOMY SYNDROME: Primary | ICD-10-CM

## 2024-09-23 PROCEDURE — 99213 OFFICE O/P EST LOW 20 MIN: CPT | Performed by: PAIN MEDICINE

## 2024-09-23 PROCEDURE — 99214 OFFICE O/P EST MOD 30 MIN: CPT

## 2024-09-23 PROCEDURE — 3079F DIAST BP 80-89 MM HG: CPT | Performed by: PAIN MEDICINE

## 2024-09-23 PROCEDURE — 3075F SYST BP GE 130 - 139MM HG: CPT | Performed by: PAIN MEDICINE

## 2024-09-30 ENCOUNTER — TELEPHONE (OUTPATIENT)
Age: 59
End: 2024-09-30

## 2024-09-30 NOTE — TELEPHONE ENCOUNTER
CREATED AND FAXED PUMP REFILL ORDER TO [facility]          ORDER ID: [id number]     EXPECTED DELIVERY DATE:  10/04/2024     REFILL DATE: 10/7/2024    CHANGE FROM PREVIOUS ORDER: YES    HOME CONNECT: NO    MEDICATION ORDERED/CONCENTRATION/UNIT    PF Morphine Sulfate 4 mg/mL    PF Bupivacaine HCl 4 mg/mL    TOTAL VOLUME 20 (twenty) mL     [facility] ORDER ATTACHED TO THIS ENCOUNTER.

## 2024-10-02 ENCOUNTER — OFFICE VISIT (OUTPATIENT)
Dept: ORTHOPEDIC SURGERY | Facility: CLINIC | Age: 59
End: 2024-10-02
Payer: COMMERCIAL

## 2024-10-02 ENCOUNTER — HOSPITAL ENCOUNTER (OUTPATIENT)
Dept: RADIOLOGY | Facility: CLINIC | Age: 59
Discharge: HOME | End: 2024-10-02
Payer: COMMERCIAL

## 2024-10-02 DIAGNOSIS — M54.10 BACK PAIN WITH RADICULOPATHY: Primary | ICD-10-CM

## 2024-10-02 DIAGNOSIS — M54.10 BACK PAIN WITH RADICULOPATHY: ICD-10-CM

## 2024-10-02 PROCEDURE — 99214 OFFICE O/P EST MOD 30 MIN: CPT | Performed by: PHYSICIAN ASSISTANT

## 2024-10-02 PROCEDURE — 72110 X-RAY EXAM L-2 SPINE 4/>VWS: CPT | Performed by: ORTHOPAEDIC SURGERY

## 2024-10-02 PROCEDURE — 72120 X-RAY BEND ONLY L-S SPINE: CPT

## 2024-10-02 PROCEDURE — 99215 OFFICE O/P EST HI 40 MIN: CPT | Performed by: PHYSICIAN ASSISTANT

## 2024-10-02 RX ORDER — GABAPENTIN 300 MG/1
300 CAPSULE ORAL 3 TIMES DAILY
Qty: 90 CAPSULE | Refills: 0 | Status: SHIPPED | OUTPATIENT
Start: 2024-10-02 | End: 2024-11-01

## 2024-10-02 NOTE — PROGRESS NOTES
Established patient to the practice that we have done surgery on in the past we did an L2-3 lateral approach above her prior surgeries.  Comes to the office complaining of some back pain mainly right leg pain.  This started at least a good 3 months ago she had a pain pump in put in by Dr. choudhary and does not seem to be helping much.  She had an MRI done and they tried to do a needle aspiration of a synovial cyst.  Patient complains of little bit of right back pain buttock pain right leg pain all way to the foot numbness and tingling sensory change affecting her daily functioning and working.  No bowel or bladder complaints or saddle anesthesia.  No trauma accidents or falls to cause it.  She just had a recent pain pump put in and he is increased it to the max as far as she knows.  And not touching the pain.    We looked at patient's recent blood work.  Check a metabolic panel glucose 86 sodium 139 potassium 5.1.  Looked at primary doctors note we checked medication list see if she is on a statin to cause muscular aches and pains she is taking Lipitor.  She is also on Plavix as far as a blood thinner she also takes vitamins but not vitamin D or E.    Physical exam: Well-nourished, well kept.  No lymphangitis or lymphadenopathy in the examined extremities.  Good perfusion to the extremities ×4.  Radial and dorsalis pedis pulses 2+.  Capillary refill to all 4 digits brisk.  No distal edema x 4.  Patient walks with antalgic gait favoring the right leg.  Examination of the neck reveals no swelling, step-off, or point tenderness.  Range of motion with flexion, extension, side bending and rotation is well maintained without crepitance, instability, or exacerbation of pain.  Strength is within normal limits.  Decreased range of motion flexion-extension rotation cervical spine tender good strength no instability.  Examination of the upper extremities reveals no point tenderness, swelling, or deformity.  Range of motion of  the shoulders, elbows, wrists, and fingers are all full without crepitance, instability, or exacerbation of pain.  Strength is 5/5 throughout.  Patient has some weakness in right plantarflexion.  Which is about 4/5 secondary to pain.  Otherwise examination of the lower extremities reveals no point tenderness, swelling, or deformity.  Range of motion of the hips, knees, and ankles are full without crepitance, instability, or exacerbation of pain.  Strength is 5/5 throughout.  No redness, abrasions, or lesions on all 4 extremities, head and neck, or trunk.  Gross sensation intact in the extremities ×4.  Deep tendon reflexes 2+ and symmetric bilaterally.  Nolvia, clonus, and Babinski were negative.  Straight leg raise negative.  Affect normal.  Alert and oriented ×3.  Coordination normal.    X-ray lumbar spine taken today AP lateral flexion-extension shows interbody fusions L3-4 L4-5 with instrumentation she also has a stand-alone interbody graft from an lateral approach.  At L2-3 she also has a pain pump implanted.  I do not see any obvious fractures dislocations bony lytic lesions.  She had an MRI done which reviewed from [facility] and report was reviewed as well showing a synovial cyst versus disc herniation or combination of the 2 at L5-S1 on the right.  Impinging that nerve root.  Causing lateral recess stenosis.    Assessment: This is a patient with right leg pain mainly leg compared to the back.  Going on for about 3 months affecting her daily functioning work and we talked about treatment options.  Her pain doctor tried to do a aspiration of that cyst.  But he was unable to.  I explained to her that where that is that he would probably be not be able to reach that or get to that.  We talked about conservative care physical therapy epidural injections and if she has an injection or could be delaying surgical invention or surgery.  Patient wants to have surgery soon as possible.  She understands surgery elective  she understands she does not have to have surgery she understands she might not get better and make it worse after surgery we discussed all the risk benefits the options of surgery.  All of the risks, benefits, and potential complications for operative and nonoperative treatment were discussed at length with the patient.  The patient understands that surgery is elective.  The patient understands that I do not have to do surgery.  The patient understands that surgery comes with more risks than not doing surgery.  Risks of operative intervention include, but are not limited to: Anesthesia complications, excessive blood loss, infection, damaged to uninjured structures including, but not limited to, the dural sac and nerve roots, blood clots, and lack of improvement or worsening of symptoms.  These complications could result in death, permanent disability, or need for reoperation.  The patient completely understood those risks and wished to proceed with operative intervention.    Plan: After reviewing the MRI it looks like the lamina are intact at the L5-S1 level.  There are some scar just above it.  I think there is a possibility we could do this with a globus retractor coming and then to the right side taking that lamina down and removing of that facet cyst disc herniation.  I advised her that Dr. Love may changes approach it when he sees her.  I will also order some physical therapy.  Patient has been doing therapy exercises and strengthening but really unable to really do much as far as activity because of her pain and decreased range of motion

## 2024-10-03 ENCOUNTER — TELEPHONE (OUTPATIENT)
Age: 59
End: 2024-10-03

## 2024-10-03 NOTE — TELEPHONE ENCOUNTER
Pt r/sed to 10/7 @11:45. Pt also wanted Dr Farley to know that she saw Dr. Rider and is scheduled for surgery on her back at the end of October.

## 2024-10-03 NOTE — TELEPHONE ENCOUNTER
Lvm for patient asking patient to call the office to r/s her pain pump refill. Dr Farley will not be in the office in the afternoon on 10/07/2024. Patient can come in the morning or r/s to a different day.

## 2024-10-04 NOTE — TELEPHONE ENCOUNTER
RECEIVED MEDICATION FOR PUMP REFILL. SECURED IN OPEN MEDICATION CABINET.    MEDICATION USE BY DATE: 11/01/2024

## 2024-10-08 ENCOUNTER — PROCEDURE VISIT (OUTPATIENT)
Age: 59
End: 2024-10-08
Payer: COMMERCIAL

## 2024-10-08 ENCOUNTER — OFFICE VISIT (OUTPATIENT)
Dept: ORTHOPEDIC SURGERY | Facility: CLINIC | Age: 59
End: 2024-10-08
Payer: COMMERCIAL

## 2024-10-08 ENCOUNTER — TELEPHONE (OUTPATIENT)
Age: 59
End: 2024-10-08

## 2024-10-08 VITALS
WEIGHT: 141 LBS | BODY MASS INDEX: 27.68 KG/M2 | HEIGHT: 60 IN | DIASTOLIC BLOOD PRESSURE: 78 MMHG | SYSTOLIC BLOOD PRESSURE: 156 MMHG

## 2024-10-08 DIAGNOSIS — M54.16 LUMBAR RADICULOPATHY: Primary | ICD-10-CM

## 2024-10-08 DIAGNOSIS — M70.61 TROCHANTERIC BURSITIS OF RIGHT HIP: ICD-10-CM

## 2024-10-08 DIAGNOSIS — M47.817 LUMBOSACRAL SPONDYLOSIS WITHOUT MYELOPATHY: ICD-10-CM

## 2024-10-08 DIAGNOSIS — M96.1 LUMBAR POST-LAMINECTOMY SYNDROME: Primary | ICD-10-CM

## 2024-10-08 PROCEDURE — 20610 DRAIN/INJ JOINT/BURSA W/O US: CPT | Mod: RT | Performed by: ORTHOPAEDIC SURGERY

## 2024-10-08 PROCEDURE — 99214 OFFICE O/P EST MOD 30 MIN: CPT | Performed by: ORTHOPAEDIC SURGERY

## 2024-10-08 PROCEDURE — 99215 OFFICE O/P EST HI 40 MIN: CPT | Performed by: ORTHOPAEDIC SURGERY

## 2024-10-08 PROCEDURE — 2500000004 HC RX 250 GENERAL PHARMACY W/ HCPCS (ALT 636 FOR OP/ED): Performed by: ORTHOPAEDIC SURGERY

## 2024-10-08 PROCEDURE — 62370 ANL SP INF PMP W/MDREPRG&FIL: CPT | Performed by: PAIN MEDICINE

## 2024-10-08 PROCEDURE — 99215 OFFICE O/P EST HI 40 MIN: CPT

## 2024-10-08 RX ORDER — TRIAMCINOLONE ACETONIDE 40 MG/ML
40 INJECTION, SUSPENSION INTRA-ARTICULAR; INTRAMUSCULAR
Status: COMPLETED | OUTPATIENT
Start: 2024-10-08 | End: 2024-10-08

## 2024-10-08 RX ORDER — LIDOCAINE HYDROCHLORIDE 10 MG/ML
9 INJECTION, SOLUTION INFILTRATION; PERINEURAL
Status: COMPLETED | OUTPATIENT
Start: 2024-10-08 | End: 2024-10-08

## 2024-10-08 RX ORDER — GABAPENTIN 300 MG/1
300 CAPSULE ORAL 3 TIMES DAILY
COMMUNITY
Start: 2024-10-02 | End: 2024-11-01

## 2024-10-08 NOTE — PROGRESS NOTES
Chloé Stone (:  1965) is a 58 y.o. female,Established patient, here for evaluation of the following chief complaint(s):  No chief complaint on file.         ASSESSMENT/PLAN:  1. Lumbar post-laminectomy syndrome, New onset ibncrease in pain and possible weakness.  MRI w/wo Contrast  shows:  L5-S1: There is a disc bulge with a large synovial cyst within the right lateral aspect of the spinal canal measuring approximately the 10 x 6 x 19  mm.  This impinges upon the right S1 nerve root within the subarticular and lateral recess.  There is mild spinal canal stenosis.  Facet arthrosis  contributes moderate to severe right and minimal left neural foraminal narrowing.     2,     Pump Refill: Discussed Procedure, Pump accessed with , Changed volume to 20ccs,   Site cleansed with Betadine, Port accessed with 25G needle, With mikey 0.5cc fluid refilled with 20cc of Morphine 4 mg per cc and Bupivacaine 4mg per cc, Current rate 0.8 mg a day alarm date is 10/31/24.  3. Right L5-S1 Trans foraminal Epidural Steroid injection had 100% relief for 1 day, advised that the Steroid still may have a chance to help. Increased her Pump meds to 0.8 mg a day from 0.6mg a day. refill before 1/10 /25  4. Seen  Dr Culver was reffered to Dr Coppola at [facility]  for possible decompression..         Subjective   SUBJECTIVE/OBJECTIVE:  Patient presents for pain Low back radiating to Rt L/E 1-2 months. No loss of Bowel or bladder controll,  Feels like leg is giving out but not fallen yet, Had Epidurals, Laminotomies with no Relief.   MRI shows Pedicle screws inserted at the L3, L4 and L5 levels bilaterally. Intervertebral spacers have been inserted at L3-4 and L4-5 levels and cause artifacts. No abnormal sites of enhancement. Last PT did not help and made it worse. Was on narcs and had a suicidal attempt with them in the past, On Plavix since 3 years for a cardiac stent,  Review of Systems   Constitutional: Negative.    HENT:

## 2024-10-08 NOTE — TELEPHONE ENCOUNTER
I called patient per Dr. Farley would like her come in sooner rather than her scheduled appt on 1/7. Please reschedule her to Fri. 1/3.

## 2024-10-08 NOTE — PROGRESS NOTES
Shazia Wolfe is a 58 y.o. female who presents for New Patient Visit of the Lower Back (Ref by RENETTA, Xray at [facility], MRI at [facility]).    HPI:  58-year-old female here for surgical evaluation of low back pain.  Sent by Filemon Jeo.  She denies fever chills nausea vomiting night sweats.  She has no bowel or bladder complaints.    Physical exam:  Well-nourished, well kept.No lymphangitis or lymphadenopathy in the examined extremities.  Gait normal.  Can stand on heels and toes.   Examination of the back shows tenderness in the paraspinous musculature centrally in the lumbosacral area.  There is decreased range of motion in all directions due to guarding/muscle spasms and pain at extremes.  There is good strength and no instability.  Examination of the lower extremities reveals no point tenderness, swelling, or deformity.  Range of motion of the hips, knees, and ankles are full without crepitance, instability, or exacerbation of pain, except she is exquisitely tender over her right greater trochanteric bursa.  Strength is 5/5 throughout.  No redness, abrasions, or lesions on extremities  Gross sensation intact in the extremities.  Deep tendon reflexes 1+ bilateral. Clonus negative.  Affect normal.  Alert and oriented ×3.  Coordination normal.    Imaging studies:  An MRI of the lumbar spine from [facility] from September 9, 2024 was reviewed.  X-rays of the lumbar spine from October 2, 2024 were reviewed today.  X-rays of the thoracic spine from March 2024 were reviewed today.    Assessment:  58-year-old female here for surgical evaluation of mostly right leg pain and some mild low back pain.  She is known to Dr. Love.  In 2012 and 2014 she had instrumented lumbar fusions at L3-4 and L4-5, in 2016 she had a lateral lumbar fusion at 23. about 15 months ago for pain management  Installed a pain pump, which gave her some pretty good relief up until about 3 months ago.  Her chief complaint is right leg pain  that starts in the right low back that radiates to the right buttock down the right lateral thigh and hamstring into the right calf into the right ankle and foot.  That pain can be quite severe at times.  She has a cyst at L5-S1, her pain management doctor tried to aspirate that but could not, he did give her an epidural injection on September 23, 2024.  She is exquisitely tender over her right greater trochanteric bursa, she may have a bursitis component to this as well.  This is significantly affecting her bodily function, she cannot work any amount of time.    We have reviewed tests today x-rays x 2, MRI.  I reviewed the notes from Filemon Joe from October 2, 2024.  The notes from her pain management doctor, Dr. Layton were reviewed from September 23, 2024.  We did consider and discuss surgery today.  This is an exacerbation of a chronic problem that is significantly affecting her bodily function.    For complete plan and/or surgical details, please refer to Dr. Love's portion of this split dictation.    -Marc Owusu PA-C    In a face-to-face encounter, I performed a history and physical examination, discussed pertinent diagnostic studies if indicated, and discussed diagnosis and management strategies with both the patient and the midlevel provider.  I reviewed the midlevel's note and agree with the documented findings and plan of care.    Status post L4-5 PLIF, L3-4 TLIF and L2-3 lateral lumbar fusion with lateral plate and screw construct.  Patient is fused nicely at those 3 levels.  She now has a facet cyst on the right at L5-S1 giving her right leg radiculopathy.  She does have right hip bursitis which may be contributing as well but I think the L5-S1 facet cyst is the main pain generator.    Risks/benefits of a cortisone injection including infection, local skin irritation, skin atrophy, calcification, continued pain/discomfort, elevated blood sugar, burning, failure to relieve pain, and  possible leg infection.  Postop discomfort can be alleviated with additional medications/ice/elevation/rest over the first 24 hours as recommended.  After explaining these issues to the patient, it was understood.  The injection site was sprayed with ethyl chloride.    L Inj/Asp: R hip joint on 10/8/2024 11:37 AM  Indications: pain  Details: 22 G needle, lateral approach  Medications: 40 mg triamcinolone acetonide 40 mg/mL; 9 mL lidocaine 10 mg/mL (1 %)      We are going to refer her to one of my colleagues [place] for L5-S1 facet excision and fusion at L5-S1 and combining that with the fusion above.  We will see if that hip shot helps her at all.  She is severely inhibited with bodily function.  We have discussed and considered surgery today.    Herman Love MD  Orthopedic surgery

## 2024-10-11 ENCOUNTER — EVALUATION (OUTPATIENT)
Dept: PHYSICAL THERAPY | Facility: CLINIC | Age: 59
End: 2024-10-11
Payer: COMMERCIAL

## 2024-10-11 DIAGNOSIS — M54.16 LUMBAR RADICULOPATHY: Primary | ICD-10-CM

## 2024-10-11 PROCEDURE — 97110 THERAPEUTIC EXERCISES: CPT | Mod: GP | Performed by: PHYSICAL THERAPIST

## 2024-10-11 PROCEDURE — 97162 PT EVAL MOD COMPLEX 30 MIN: CPT | Mod: GP | Performed by: PHYSICAL THERAPIST

## 2024-10-11 ASSESSMENT — PATIENT HEALTH QUESTIONNAIRE - PHQ9
1. LITTLE INTEREST OR PLEASURE IN DOING THINGS: NOT AT ALL
2. FEELING DOWN, DEPRESSED OR HOPELESS: NOT AT ALL
SUM OF ALL RESPONSES TO PHQ9 QUESTIONS 1 AND 2: 0

## 2024-10-11 NOTE — PROGRESS NOTES
Physical Therapy Evaluation and Treatment      Patient Name: Shazia Wolfe  MRN: [id number]  Today's Date: 10/11/2024  Time Calculation  Start Time: 1126  Stop Time: 1200  Time Calculation (min): 34 min      PT Evaluation Time Entry  PT Evaluation (Moderate) Time Entry: 20  PT Therapeutic Procedures Time Entry  Therapeutic Exercise Time Entry: 12                   INSURANCE:  Visit number: 1/1  [employer] MEDICAID [id number] RAYMOND YR COPAY 0 DED 0  COVERAGE 100 OOP 0 NO AUTH REQ   RF# [id number]     Referring Provider: Herman Love MD  Hx: Angina, CAD/stent, HTN, Asthma, HAs, Thyroid, Kidney Disease, Anemia, L2-5 rods/cages    ASSESSMENT:  PT Assessment Results: decreased knowledge of HEP, activity limitations, ADLs/IADLs/self care skills, flexibility, motor function/control/tone, pain, participation restrictions, range of motion/joint mobility, strength, posture, transfers, coordination, balance, fall risk, gait/locomotion, decreased knowledge of precautions.     Evolving with changing characteristics    Shazia Wolfe is a 58 y.o. female presenting to the clinic with lumbar radiculopathy and R hip/LE pain. Pt demonstrates decreased ROM and strength of the lumbar spine and B hips causing pain and dysfunction with driving, bending, lifting, standing, walking, stairs, gardening, tub transfer, and looking after grandchildren. Pt was given and reviewed HEP including stretching and strengthening. The patient was educated on the importance of positioning, proper posture/use of lumbar roll, and body mechanics with transfers/log roll. Pt is not a good candidate for PT at this time and requires further evaluation/treatment from her physician.     PLAN:  Treatments/Interventions: education/instruction, home program, self care/home management, therapeutic activities, therapeutic exercises.   PT Plan: Initial Assessment and Treatment only  PT Frequency: 1 time per week  Duration: 1 visit  Onset Date:  06/01/24    Goals -    No goals established at this time.    CURRENT PROBLEM:   1. Lumbar radiculopathy            Subjective    General -    Pt states that her low back pain started to get worse in June. Pt states that she has had 3 other surgeries in the past. Pt has rods and cages from L2 - L5, but that now here issues are with L5 - S1 with bulging disc and a cyst. Pt also had been having R hip pain, which she got an injection which has helped a little bit.    Mechanism of Injury -    Insidious Onset    History of Current Episode - 6/1/24    Pain -    Pain Location: Lumbar and R hip, down the R LE  Pain Best: 8/10  Pain Today: 8/10  Pain Worst: 10/10   Pain Type: Constant, Achy, Sharp, Stiffness/Tightness, Numbness, Tingling  Pain Exacerbating Factors: driving, bending, lifting, standing, walking, stairs, gardening, tub transfer, and looking after grandchildren.  Pain Relieving Factors: Heating Pad alternating Ice Pack, Magnesium    Difficulty Sleeping: Sometimes  Night Sweats, Loss of Appetite, Unexpected Weight-loss: Yes, doctors are aware  Saddle/Bowel/Bladder: No    Work -   Work Status: Full Time   [employer], now down to 3, 12 hour shifts    Home Living -    Stairs into Home: 1 step  Pt lives by herself.    PRECAUTIONS -    Kidney issues, HTN, Stents    Prior Level of Function -    I with ADLs/IADLs     Objective     Hip AROM (degrees) - WFL grossly except IR and ext    Hip MMT (/5) -  R hip Flexion: 4   R hip ER: 4- with pain  R hip Abduction: 4-   L hip Flexion: 4    L hip ER: 4-   L hip Abduction: 4-     Lumbar AROM -   Lumbar Flexion: 60% with pain  Lumbar Extension: 10% with pain    Posture -   Increased Lumbar Lordosis/APT  Forward Trunk Lean    Outcome Measures -   Other Measures  Oswestry Disablity Index (DEEJAY): 30 (/50 or 60%)     Treatment -   Evaluation -   Moderate (01846)  Therapeutic Exercise (75623) -     Seated Correct Posture/Log Roll: reviewed   Reviewed HEP    OP Patient Education -   Access  Code: [email or web address]  URL: [email or web address]  Date: 10/11/2024  Prepared by: Valarie Huerta    Exercises  - Seated Correct Posture   - Supine Transversus Abdominis Bracing - Hands on Ground  - 1-2 x daily - 2 sets - 10 reps - 5 sec hold  - Seated Multifidi Isometric  - 1-2 x daily - 2 sets - 10 reps - 5 sec  hold  - Modified Mathew Stretch  - 1-2 x daily - 3 sets - 30 seconds hold  - Bridge on Heels  - 1-2 x daily - 3 sets - 10 reps - 2 sec hold  - Supine Figure 4 Piriformis Stretch  - 1-2 x daily - 3 sets - 30 sec hold  - Supine Piriformis Stretch with Foot on Ground  - 1-2 x daily - 3 sets - 30 sec hold    Patient Education  - Log Roll

## 2024-10-25 ENCOUNTER — APPOINTMENT (OUTPATIENT)
Dept: ORTHOPEDIC SURGERY | Facility: CLINIC | Age: 59
End: 2024-10-25
Payer: COMMERCIAL

## 2024-11-25 ENCOUNTER — TELEPHONE (OUTPATIENT)
Age: 59
End: 2024-11-25

## 2024-11-25 NOTE — TELEPHONE ENCOUNTER
M FOR PATIENT TO CALL THE OFFICE.  PER DR TYLER, PLEASE ASK PATIENT IF SHE SAW THE SURGEON? IF SO, WHAT IS THE PLAN? IS SHE HAVING SURGERY?

## 2024-11-25 NOTE — TELEPHONE ENCOUNTER
PATIENT CALLED AND SHE IS IN A LOT OF PAIN SHE IS AT WORK AND WANTS TO KNOW IF YOU COULD SEE HER TOMORROW . PLEASE ADVISE

## 2024-11-26 NOTE — TELEPHONE ENCOUNTER
PATIENT WAS MADE AWARE THAT PER VERBAL DISCUSSION WITH DR TYLER, HE STATED HE WOULD PRESCRIBE ORAL PAIN MEDICATION WHEN HE SEE'S HER ON 01/03/2024 FOR HER PUMP REFILL AND THAT HE SAID HE CAN ONLY PRESCRIBE ORAL PILLS TO HELP WITH POST OP PAIN SINCE PATIENT IS SCHEDULED FOR SURGERY ON 01/06/2024. PATIENT WAS NOT HAPPY WITH THIS RESPONSE. PATIENT SCHEDULED A FOLLOW UP WITH DR TYLER TO DISCUSS REMOVING THE PAIN PUMP.

## 2024-12-03 ENCOUNTER — TELEPHONE (OUTPATIENT)
Age: 59
End: 2024-12-03

## 2024-12-03 NOTE — TELEPHONE ENCOUNTER
TRIED TO CALL PATIENT TO TALK TO HER PER DR TYLER'S REQUEST. UNABLE TO LEAVE A VOICEMAIL DUE TO MAIL BOX FULL. IF PATIENT CALLS BACK PLEASE MESSAGE ME SO THAT I CAN TALK TO HER.

## 2024-12-12 ENCOUNTER — TELEPHONE (OUTPATIENT)
Dept: CARDIOLOGY | Facility: CLINIC | Age: 59
End: 2024-12-12
Payer: MEDICARE

## 2024-12-16 ENCOUNTER — TELEPHONE (OUTPATIENT)
Age: 59
End: 2024-12-16

## 2024-12-16 NOTE — TELEPHONE ENCOUNTER
SPOKE TO PATIENT REGARDING PAIN PUMP REFILL ON 01/03/2024. INFORMED HER DR TYLER HAD STATED HE WOULD DISCUSS POST OP MEDICATION WITH HER AT THAT TIME. PATIENT CONFIRMED PUMP REFILL.

## 2024-12-19 ENCOUNTER — TELEPHONE (OUTPATIENT)
Age: 59
End: 2024-12-19

## 2024-12-19 NOTE — TELEPHONE ENCOUNTER
CREATED AND FAXED PUMP REFILL ORDER TO [facility] ([facility]          ORDER ID: [id number]     EXPECTED DELIVERY DATE:  12/30/2024     REFILL DATE: 1/3/2025    CHANGE FROM PREVIOUS ORDER: NO    HOME CONNECT: NO    MEDICATION ORDERED/CONCENTRATION/UNIT    PF Morphine Sulfate 4 mg/mL    PF Bupivacaine HCl 4 mg/mL    TOTAL VOLUME 20 (twenty) mL     [facility] ORDER ATTACHED TO THIS ENCOUNTER.

## 2025-01-03 ENCOUNTER — PROCEDURE VISIT (OUTPATIENT)
Age: 60
End: 2025-01-03
Payer: COMMERCIAL

## 2025-01-03 VITALS
HEIGHT: 60 IN | DIASTOLIC BLOOD PRESSURE: 70 MMHG | TEMPERATURE: 99.2 F | WEIGHT: 133 LBS | BODY MASS INDEX: 26.11 KG/M2 | SYSTOLIC BLOOD PRESSURE: 120 MMHG

## 2025-01-03 DIAGNOSIS — M96.1 LUMBAR POST-LAMINECTOMY SYNDROME: Primary | ICD-10-CM

## 2025-01-03 PROCEDURE — 3078F DIAST BP <80 MM HG: CPT | Performed by: PAIN MEDICINE

## 2025-01-03 PROCEDURE — 99215 OFFICE O/P EST HI 40 MIN: CPT | Performed by: PAIN MEDICINE

## 2025-01-03 PROCEDURE — 3074F SYST BP LT 130 MM HG: CPT | Performed by: PAIN MEDICINE

## 2025-01-03 PROCEDURE — 99213 OFFICE O/P EST LOW 20 MIN: CPT | Performed by: PAIN MEDICINE

## 2025-01-03 RX ORDER — DULOXETIN HYDROCHLORIDE 20 MG/1
1 CAPSULE, DELAYED RELEASE ORAL DAILY
COMMUNITY

## 2025-01-03 RX ORDER — FLUTICASONE PROPIONATE 50 MCG
2 SPRAY, SUSPENSION (ML) NASAL DAILY
COMMUNITY

## 2025-01-03 RX ORDER — ERGOCALCIFEROL 1.25 MG/1
50000 CAPSULE ORAL
COMMUNITY
Start: 2024-12-11 | End: 2025-01-22

## 2025-01-03 RX ORDER — GABAPENTIN 600 MG/1
600 TABLET ORAL 3 TIMES DAILY
COMMUNITY
Start: 2024-11-21

## 2025-01-09 DIAGNOSIS — M96.1 LUMBAR POST-LAMINECTOMY SYNDROME: Primary | ICD-10-CM

## 2025-01-09 RX ORDER — OXYCODONE HYDROCHLORIDE 5 MG/1
5 TABLET ORAL EVERY 6 HOURS PRN
Qty: 20 TABLET | Refills: 0 | Status: SHIPPED | OUTPATIENT
Start: 2025-01-09 | End: 2025-01-16

## 2025-01-09 NOTE — TELEPHONE ENCOUNTER
Patient states she was not given pain medication after her surgery 1/6/25, only flexeril was sent with her. Will Dr. Farley send in oxycodone?

## 2025-02-06 ENCOUNTER — APPOINTMENT (OUTPATIENT)
Dept: CARDIOLOGY | Facility: CLINIC | Age: 60
End: 2025-02-06
Payer: COMMERCIAL

## 2025-02-21 ENCOUNTER — TELEPHONE (OUTPATIENT)
Dept: CARDIOLOGY | Facility: CLINIC | Age: 60
End: 2025-02-21
Payer: MEDICARE

## 2025-02-21 NOTE — TELEPHONE ENCOUNTER
Received request for prescription refills for patient from [facility] as she has switched pharmacy.  Patient follows with Dr. Que Villasenor     Request is for Atorvastatin 80 mg, Metoprolol succinate 50 mg and Nitroglycerin  Is patient currently on medication yes    Last OV 2/8/24  Next OV none pending, patient cancelled OV for 2/6/25   Routed to clerical to contact patient and reschedule appt.

## 2025-02-24 DIAGNOSIS — I25.10 CAD S/P PERCUTANEOUS CORONARY ANGIOPLASTY: ICD-10-CM

## 2025-02-24 DIAGNOSIS — E78.2 MIXED HYPERLIPIDEMIA: ICD-10-CM

## 2025-02-24 DIAGNOSIS — I25.10 ARTERIOSCLEROSIS OF CORONARY ARTERY IN PATIENT WITH HISTORY OF MYOCARDIAL INFARCTION: ICD-10-CM

## 2025-02-24 DIAGNOSIS — I25.2 ARTERIOSCLEROSIS OF CORONARY ARTERY IN PATIENT WITH HISTORY OF MYOCARDIAL INFARCTION: ICD-10-CM

## 2025-02-24 DIAGNOSIS — Z98.61 CAD S/P PERCUTANEOUS CORONARY ANGIOPLASTY: ICD-10-CM

## 2025-02-24 RX ORDER — METOPROLOL SUCCINATE 50 MG/1
50 TABLET, EXTENDED RELEASE ORAL DAILY
Qty: 30 TABLET | Refills: 0 | Status: CANCELLED | OUTPATIENT
Start: 2025-02-24 | End: 2025-03-26

## 2025-02-24 RX ORDER — NITROGLYCERIN 0.4 MG/1
TABLET SUBLINGUAL
Qty: 25 TABLET | Refills: 5 | Status: CANCELLED | OUTPATIENT
Start: 2025-02-24

## 2025-02-24 RX ORDER — ATORVASTATIN CALCIUM 80 MG/1
80 TABLET, FILM COATED ORAL DAILY
Qty: 30 TABLET | Refills: 0 | Status: CANCELLED | OUTPATIENT
Start: 2025-02-24 | End: 2025-03-26

## 2025-02-24 NOTE — TELEPHONE ENCOUNTER
Received request for prescription refill for patient.  Patient follows with Dr. Que Villasenor MD     Request is for metoprolol, atorvastatin, nitroglycerin   Is patient currently on medication- yes    Last OV- 2/8/24  Next OV- not scheduled. Patient cancelled 1 year follow up on 2/21/25    MAHER 305/320 CLERICAL- please schedule patient for a follow up and then route to Janelle TOMLINSON LPN or myself for refill completion.

## 2025-02-26 NOTE — TELEPHONE ENCOUNTER
Called and spoke with patient. States she would like to call the office back to schedule as she would like to correlate this appointment with her appointment with Pain Management. States she will return the call once she gets home.

## 2025-03-12 ENCOUNTER — TELEPHONE (OUTPATIENT)
Age: 60
End: 2025-03-12

## 2025-03-12 NOTE — TELEPHONE ENCOUNTER
Pt was wondering if Dr. Farley can write her a letter stating it is okay for her to return to work. Please Advise.

## 2025-03-28 ENCOUNTER — TELEPHONE (OUTPATIENT)
Age: 60
End: 2025-03-28

## 2025-03-28 NOTE — TELEPHONE ENCOUNTER
CREATED AND FAXED PUMP REFILL ORDER TO [facility]          ORDER ID: [id number]     EXPECTED DELIVERY DATE:  03/31/2025     REFILL DATE: 4/2/2025    CHANGE FROM PREVIOUS ORDER: NO    HOME CONNECT: NO    MEDICATION ORDERED/CONCENTRATION/UNIT    PF Morphine Sulfate 4 mg/mL    PF Bupivacaine HCl 4 mg/mL    TOTAL VOLUME 20 (twenty) mL     [facility] ORDER ATTACHED TO THIS ENCOUNTER.

## 2025-04-02 ENCOUNTER — PROCEDURE VISIT (OUTPATIENT)
Age: 60
End: 2025-04-02
Payer: MEDICARE

## 2025-04-02 VITALS
OXYGEN SATURATION: 98 % | HEIGHT: 60 IN | WEIGHT: 133 LBS | BODY MASS INDEX: 26.11 KG/M2 | TEMPERATURE: 97.6 F | HEART RATE: 102 BPM

## 2025-04-02 DIAGNOSIS — M96.1 LUMBAR POST-LAMINECTOMY SYNDROME: ICD-10-CM

## 2025-04-02 PROCEDURE — 62370 ANL SP INF PMP W/MDREPRG&FIL: CPT | Performed by: PAIN MEDICINE

## 2025-04-02 NOTE — PROGRESS NOTES
Chloé Stone (:  1965) is a 58 y.o. female,Established patient, here for evaluation of the following chief complaint(s):  No chief complaint on file.         ASSESSMENT/PLAN:  1. Lumbar post-laminectomy syndrome, New onset ibncrease in pain and possible weakness.  MRI w/wo Contrast  shows:  L5-S1: There is a disc bulge with a large synovial cyst within the right lateral aspect of the spinal canal measuring approximately the 10 x 6 x 19  mm.  This impinges upon the right S1 nerve root within the subarticular and lateral recess.  There is mild spinal canal stenosis.  Facet arthrosis  contributes moderate to severe right and minimal left neural foraminal narrowing.     Had fusion in January on Oxycodone Temporarily for Post op Pain, advised to stop this  2,   Pump Refill: Discussed Procedure, Pump accessed with , Changed volume to 20ccs,   Site cleansed with Betadine, Port accessed with 25G needle, With mikey 0.5cc fluid refilled with 20cc of Morphine 4 mg per cc and Bupivacaine 4mg per cc, Current rate 0.8 mg a day alarm date is 10/31/24.  3. Right L5-S1 Trans foraminal Epidural Steroid injection had 100% relief for 1 day, advised that the Steroid still may have a chance to help. Increased her Pump meds to 0.8 mg a day from 0.6mg a day. refill before 25  4. Seen  Dr Culver was reffered to Dr Coppola at at [facility] for possible decompression..         Subjective   SUBJECTIVE/OBJECTIVE:  Patient presents for pain Low back radiating to Rt L/E 1-2 months. No loss of Bowel or bladder controll,  Feels like leg is giving out but not fallen yet, Had Epidurals, Laminotomies with no Relief.   MRI shows Pedicle screws inserted at the L3, L4 and L5 levels bilaterally. Intervertebral spacers have been inserted at L3-4 and L4-5 levels and cause artifacts. No abnormal sites of enhancement. Last PT did not help and made it worse. Was on narcs and had a suicidal attempt with them in the past, On Plavix

## 2025-04-10 ENCOUNTER — PATIENT MESSAGE (OUTPATIENT)
Age: 60
End: 2025-04-10

## 2025-04-11 NOTE — TELEPHONE ENCOUNTER
Patient is calling because the form she sent and the information that is needed is a time sensitive form.  She is needing to start a new job.      Patient stated see below     Hey, I know you guys just sent me a letter stating I can go back to work. But this company wants it on their letter head. And it can't say \" back to work\". It needs to just say I am physically okay to go back to work with no restrictions...sounds stupid. But that what they said they are having an issue with the \" back to\" part of it...thank you.         PATIENT ATTACHED THE REQUIRED FORM TO HER THE MELT MESSAGE   PLEASE LET PATIENT KNOW WHEN THE FORM IS READY FOR

## 2025-06-26 ENCOUNTER — TELEPHONE (OUTPATIENT)
Age: 60
End: 2025-06-26

## 2025-06-26 NOTE — TELEPHONE ENCOUNTER
CREATED AND FAXED PUMP REFILL ORDER TO [facility]          ORDER ID: [id number]     EXPECTED DELIVERY DATE:  06/30/2025     REFILL DATE: 7/1/2025    CHANGE FROM PREVIOUS ORDER: NO    HOME CONNECT: NO    MEDICATION ORDERED/CONCENTRATION/UNIT    PF Morphine Sulfate 4 mg/mL and PF Bupivacaine HCl 4 mg/mL    TOTAL VOLUME 20 (twenty) mL     [facility] ORDER ATTACHED TO THIS ENCOUNTER.

## 2025-07-01 ENCOUNTER — PROCEDURE VISIT (OUTPATIENT)
Age: 60
End: 2025-07-01
Payer: MEDICARE

## 2025-07-01 VITALS
WEIGHT: 143 LBS | OXYGEN SATURATION: 96 % | DIASTOLIC BLOOD PRESSURE: 90 MMHG | BODY MASS INDEX: 28.07 KG/M2 | SYSTOLIC BLOOD PRESSURE: 140 MMHG | TEMPERATURE: 98 F | HEIGHT: 60 IN | HEART RATE: 90 BPM

## 2025-07-01 DIAGNOSIS — M96.1 LUMBAR POST-LAMINECTOMY SYNDROME: ICD-10-CM

## 2025-07-01 PROCEDURE — 62370 ANL SP INF PMP W/MDREPRG&FIL: CPT | Performed by: PAIN MEDICINE

## 2025-07-01 NOTE — PROGRESS NOTES
Chloé Stone (:  1965) is a 58 y.o. female,Established patient, here for evaluation of the following chief complaint(s):  No chief complaint on file.         ASSESSMENT/PLAN:  1. Lumbar post-laminectomy syndrome, New onset ibncrease in pain and possible weakness.  MRI w/wo Contrast  shows:  L5-S1: There is a disc bulge with a large synovial cyst within the right lateral aspect of the spinal canal measuring approximately the 10 x 6 x 19  mm.  This impinges upon the right S1 nerve root within the subarticular and lateral recess.  There is mild spinal canal stenosis.  Facet arthrosis  contributes moderate to severe right and minimal left neural foraminal narrowing.     Had fusion in January on Oxycodone Temporarily for Post op Pain, advised to stop this  2,   Pump Refill: Discussed Procedure, Pump accessed with , Changed volume to 20ccs,   Site cleansed with Betadine, Port accessed with 25G needle, With mikey 0.5cc fluid refilled with 20cc of Morphine 4 mg per cc and Bupivacaine 4mg per cc, Current rate 0.8 mg a day alarm date is 10/31/24.  3. Right L5-S1 Trans foraminal Epidural Steroid injection had 100% relief for 1 day, advised that the Steroid still may have a chance to help. Increased her Pump meds to 0.8 mg a day  refill before 10/5/25.  4. Seen  Dr Culver was reffered to Dr Coppola at at [facility] for possible decompression..         Subjective   SUBJECTIVE/OBJECTIVE:  Patient presents for pain Low back radiating to Rt L/E 1-2 months. No loss of Bowel or bladder controll,  Feels like leg is giving out but not fallen yet, Had Epidurals, Laminotomies with no Relief.   MRI shows Pedicle screws inserted at the L3, L4 and L5 levels bilaterally. Intervertebral spacers have been inserted at L3-4 and L4-5 levels and cause artifacts. No abnormal sites of enhancement. Last PT did not help and made it worse. Was on narcs and had a suicidal attempt with them in the past, On Plavix since 3 years for a

## 2025-07-09 NOTE — TELEPHONE ENCOUNTER
Comments: Pt is discharged. This is their 30 day supply    Last Office Visit (last PCP visit):   7/15/2022    Next Visit Date:  No future appointments. **If hasn't been seen in over a year OR hasn't followed up according to last diabetes/ADHD visit, make appointment for patient before sending refill to provider.     Rx requested:  Requested Prescriptions     Pending Prescriptions Disp Refills    ondansetron (ZOFRAN) 4 MG tablet 30 tablet 0     Sig: Take 1 tablet by mouth daily as needed for Nausea or Vomiting Procedure:  COLONOSCOPY    Relevant Problems   ENDO   (+) Hypothyroidism      Meds reviewed    METS  >4    NPO?:  appropriate  Physical Exam    Airway     Mallampati score: II    Neck ROM: full  Mouth opening: >= 4 cm      Cardiovascular      Dental   No notable dental hx     Pulmonary      Neurological    She appears awake and alert.      Other Findings  post-pubertal.      Anesthesia Plan  ASA Score- 2     Anesthesia Type- IV sedation with anesthesia with ASA Monitors.         Additional Monitors:     Airway Plan: natural airway.    Comment: Daughter in law providing translation to Scientologist.       Plan Factors-Exercise tolerance (METS): >4 METS.    Chart reviewed.    Patient summary reviewed.                  Induction- intravenous.    Postoperative Plan- .   Monitoring Plan - Monitoring plan - standard ASA monitoring  Post Operative Pain Plan - non-opiod analgesics and multimodal analgesia        Informed Consent- Anesthetic plan and risks discussed with patient.  I personally reviewed this patient with the CRNA. Discussed and agreed on the Anesthesia Plan with the CRNA..      NPO Status:  Vitals Value Taken Time   Date of last liquid 07/09/25 07/09/25 13:51   Time of last liquid 1000 07/09/25 13:51   Date of last solid 07/08/25 07/09/25 13:51   Time of last solid 2230 07/09/25 13:51

## (undated) DEVICE — SYRINGE MED 30ML STD CLR PLAS LUERLOCK TIP N CTRL DISP

## (undated) DEVICE — SYRINGE MED 10ML LUERLOCK TIP W/O SFTY DISP

## (undated) DEVICE — LIQUIBAND RAPID ADHESIVE 36/CS 0.8ML: Brand: MEDLINE

## (undated) DEVICE — SUTURE VCRL SZ 3-0 L18IN ABSRB UD PS-2 L19MM 3/8 CRV PRIM J497H

## (undated) DEVICE — PACK,LAPAROTOMY,NO GOWNS: Brand: MEDLINE

## (undated) DEVICE — DRAPE,T,LAPARO,TRANS,STERILE: Brand: MEDLINE

## (undated) DEVICE — SYRINGE MED 3ML CLR PLAS STD N CTRL LUERLOCK TIP DISP

## (undated) DEVICE — ELECTRODE PT RET AD L9FT HI MOIST COND ADH HYDRGEL CORDED

## (undated) DEVICE — MARKER SURG SKIN GENTIAN VLT REG TIP W/ 6IN RUL

## (undated) DEVICE — COUNTER NDL 40 COUNT HLD 70 FOAM BLK ADH W/ MAG

## (undated) DEVICE — 3M™ TEGADERM™ TRANSPARENT FILM DRESSING FRAME STYLE, 1626W, 4 IN X 4-3/4 IN (10 CM X 12 CM), 50/CT 4CT/CASE: Brand: 3M™ TEGADERM™

## (undated) DEVICE — TOWEL,OR,DSP,ST,BLUE,STD,4/PK,20PK/CS: Brand: MEDLINE

## (undated) DEVICE — COVER LT HNDL BLU PLAS

## (undated) DEVICE — DEVICE NEUROSTIMULATOR W2.9XL3.1IN THK0.8IN 71GM

## (undated) DEVICE — HYPODERMIC SAFETY NEEDLE: Brand: MAGELLAN

## (undated) DEVICE — APPLICATOR MEDICATED 26 CC SOLUTION HI LT ORNG CHLORAPREP

## (undated) DEVICE — NEEDLE HYPO 25GA L1.5IN BLU POLYPR HUB S STL REG BVL STR

## (undated) DEVICE — SUTURE VCRL SZ 4-0 L18IN ABSRB UD L19MM PS-2 3/8 CIR PRIM J496H

## (undated) DEVICE — GLOVE ORANGE PI 7 1/2   MSG9075

## (undated) DEVICE — PACK,BASIC: Brand: MEDLINE

## (undated) DEVICE — SYRINGE MED 8ML PLAS LOSS OF RESISTANCE SYR LUERSLIP

## (undated) DEVICE — CATHETER 8591-38 PASSER,38CM

## (undated) DEVICE — Z INACTIVE USE 2863041 SPONGE GZ W4XL4IN 100% COT 16 PLY RADPQ HIGHLY ABSRB

## (undated) DEVICE — GAUZE,SPONGE,4"X4",16PLY,XRAY,STRL,LF: Brand: MEDLINE

## (undated) DEVICE — 3M™ STERI-STRIP™ REINFORCED ADHESIVE SKIN CLOSURES, R1547, 1/2 IN X 4 IN (12 MM X 100 MM), 6 STRIPS/ENVELOPE: Brand: 3M™ STERI-STRIP™

## (undated) DEVICE — BLADE ES ELASTOMERIC COAT INSUL DURABLE BEND UPTO 90DEG

## (undated) DEVICE — NEPTUNE E-SEP SMOKE EVACUATION PENCIL, COATED, 70MM BLADE, PUSH BUTTON SWITCH: Brand: NEPTUNE E-SEP

## (undated) DEVICE — C-ARM: Brand: UNBRANDED

## (undated) DEVICE — KIT ARMOR C DRP COLLAPSIBLE AND SELF EXP TOP CVR FOR FLUOROSCOPIC

## (undated) DEVICE — GOWN,AURORA,NONREINFORCED,LARGE: Brand: MEDLINE

## (undated) DEVICE — SUTURE VCRL SZ 3-0 L36IN ABSRB UD L36MM CT-1 1/2 CIR J944H

## (undated) DEVICE — LABEL MED MINI W/ MARKER

## (undated) DEVICE — BLADE,CARBON-STEEL,15,STRL,DISPOSABLE,TB: Brand: MEDLINE

## (undated) DEVICE — MANAGER PERS PAIN THER COMM HANDSET PROGRAMMABLE PROXIMITY

## (undated) DEVICE — SYRINGE IRRIG 60ML SFT PLIABLE BLB EZ TO GRP 1 HND USE W/

## (undated) DEVICE — 1010 S-DRAPE TOWEL DRAPE 10/BX: Brand: STERI-DRAPE™